# Patient Record
Sex: FEMALE | Race: WHITE | NOT HISPANIC OR LATINO | Employment: FULL TIME | URBAN - METROPOLITAN AREA
[De-identification: names, ages, dates, MRNs, and addresses within clinical notes are randomized per-mention and may not be internally consistent; named-entity substitution may affect disease eponyms.]

---

## 2023-03-28 DIAGNOSIS — M19.90 ARTHRITIS: ICD-10-CM

## 2023-03-28 RX ORDER — MELOXICAM 15 MG/1
15 TABLET ORAL DAILY
COMMUNITY
Start: 2013-09-13 | End: 2023-03-28 | Stop reason: SDUPTHER

## 2023-03-29 RX ORDER — MELOXICAM 15 MG/1
15 TABLET ORAL DAILY
Qty: 14 TABLET | Refills: 0 | Status: SHIPPED | OUTPATIENT
Start: 2023-03-29 | End: 2023-04-11 | Stop reason: SDUPTHER

## 2023-04-11 DIAGNOSIS — M19.90 ARTHRITIS: ICD-10-CM

## 2023-04-11 RX ORDER — MELOXICAM 15 MG/1
15 TABLET ORAL DAILY
Qty: 30 TABLET | Refills: 0 | Status: SHIPPED | OUTPATIENT
Start: 2023-04-11 | End: 2023-04-20 | Stop reason: SDUPTHER

## 2023-04-20 ENCOUNTER — OFFICE VISIT (OUTPATIENT)
Dept: PRIMARY CARE | Facility: CLINIC | Age: 63
End: 2023-04-20
Payer: COMMERCIAL

## 2023-04-20 VITALS
SYSTOLIC BLOOD PRESSURE: 114 MMHG | BODY MASS INDEX: 27.09 KG/M2 | WEIGHT: 162.6 LBS | HEART RATE: 101 BPM | DIASTOLIC BLOOD PRESSURE: 82 MMHG | OXYGEN SATURATION: 98 % | HEIGHT: 65 IN | RESPIRATION RATE: 18 BRPM

## 2023-04-20 DIAGNOSIS — M19.90 ARTHRITIS: ICD-10-CM

## 2023-04-20 DIAGNOSIS — Z12.31 SCREENING MAMMOGRAM, ENCOUNTER FOR: ICD-10-CM

## 2023-04-20 DIAGNOSIS — F32.A DEPRESSION, UNSPECIFIED DEPRESSION TYPE: ICD-10-CM

## 2023-04-20 DIAGNOSIS — Z00.00 HEALTH CARE MAINTENANCE: Primary | ICD-10-CM

## 2023-04-20 DIAGNOSIS — F98.8 ATTENTION DEFICIT DISORDER WITHOUT HYPERACTIVITY: ICD-10-CM

## 2023-04-20 DIAGNOSIS — Z12.11 SCREENING FOR COLON CANCER: ICD-10-CM

## 2023-04-20 DIAGNOSIS — Z13.6 SCREENING FOR CARDIOVASCULAR CONDITION: ICD-10-CM

## 2023-04-20 DIAGNOSIS — F51.01 PRIMARY INSOMNIA: ICD-10-CM

## 2023-04-20 PROBLEM — G89.29 CHRONIC BACK PAIN GREATER THAN 3 MONTHS DURATION: Status: RESOLVED | Noted: 2023-04-20 | Resolved: 2023-04-20

## 2023-04-20 PROBLEM — F41.9 ANXIETY DISORDER: Status: RESOLVED | Noted: 2023-04-20 | Resolved: 2023-04-20

## 2023-04-20 PROBLEM — R53.83 FATIGUE: Status: RESOLVED | Noted: 2023-04-20 | Resolved: 2023-04-20

## 2023-04-20 PROBLEM — J30.9 ALLERGIC RHINOSINUSITIS: Status: RESOLVED | Noted: 2023-04-20 | Resolved: 2023-04-20

## 2023-04-20 PROBLEM — M51.26 LUMBAR HERNIATED DISC: Status: ACTIVE | Noted: 2023-04-20

## 2023-04-20 PROBLEM — M54.9 CHRONIC BACK PAIN GREATER THAN 3 MONTHS DURATION: Status: RESOLVED | Noted: 2023-04-20 | Resolved: 2023-04-20

## 2023-04-20 PROBLEM — F17.200 NICOTINE DEPENDENCE: Status: RESOLVED | Noted: 2023-04-20 | Resolved: 2023-04-20

## 2023-04-20 PROBLEM — R51.9 HEADACHE: Status: RESOLVED | Noted: 2023-04-20 | Resolved: 2023-04-20

## 2023-04-20 PROBLEM — G58.8 INTERCOSTAL NEURALGIA: Status: RESOLVED | Noted: 2023-04-20 | Resolved: 2023-04-20

## 2023-04-20 PROBLEM — G47.00 INSOMNIA: Status: ACTIVE | Noted: 2023-04-20

## 2023-04-20 PROCEDURE — 99396 PREV VISIT EST AGE 40-64: CPT | Performed by: NURSE PRACTITIONER

## 2023-04-20 PROCEDURE — 1036F TOBACCO NON-USER: CPT | Performed by: NURSE PRACTITIONER

## 2023-04-20 PROCEDURE — 99213 OFFICE O/P EST LOW 20 MIN: CPT | Performed by: NURSE PRACTITIONER

## 2023-04-20 RX ORDER — MELOXICAM 15 MG/1
15 TABLET ORAL DAILY
Qty: 90 TABLET | Refills: 1 | Status: SHIPPED | OUTPATIENT
Start: 2023-04-20 | End: 2023-10-02

## 2023-04-20 RX ORDER — ALBUTEROL SULFATE 90 UG/1
2 AEROSOL, METERED RESPIRATORY (INHALATION)
COMMUNITY
Start: 2018-03-27 | End: 2024-01-30 | Stop reason: SDUPTHER

## 2023-04-20 RX ORDER — VENLAFAXINE HYDROCHLORIDE 150 MG/1
150 CAPSULE, EXTENDED RELEASE ORAL DAILY
COMMUNITY
End: 2023-05-08

## 2023-04-20 RX ORDER — TRAZODONE HYDROCHLORIDE 300 MG/1
300 TABLET ORAL NIGHTLY
Qty: 90 TABLET | Refills: 3 | Status: SHIPPED | OUTPATIENT
Start: 2023-04-20 | End: 2024-03-12

## 2023-04-20 RX ORDER — BUPROPION HYDROCHLORIDE 100 MG/1
100 TABLET, EXTENDED RELEASE ORAL DAILY
Qty: 30 TABLET | Refills: 2 | Status: SHIPPED | OUTPATIENT
Start: 2023-04-20 | End: 2023-05-15

## 2023-04-20 RX ORDER — TRAZODONE HYDROCHLORIDE 300 MG/1
300 TABLET ORAL NIGHTLY
COMMUNITY
End: 2023-04-20 | Stop reason: SDUPTHER

## 2023-04-20 NOTE — ASSESSMENT & PLAN NOTE
Discussed her UDS results at length.  Discussed that even if she did not know there may have been fentanyl possibly in the gummies that it still voided her contract as she did not have script for this herself nor did she disclose her use to me  Continue venlafaxine 150mg ER  Start buproprion 100 mg ER  Follow up in 6 weeks if no improvement

## 2023-04-20 NOTE — PROGRESS NOTES
"Subjective   Patient ID: Vandana Coppola is a 62 y.o. female who presents for Follow-up (Patient in today for routine F/U and medication refills, she would like to discuss anti-depressant medication. 162.2) and Annual Exam (CPE).    Following up for depression  Has felt more down over the past few months.   She has diagnosis of ADD - did UDS and found to have fentanyl positive which voided contract. She does not have script for fentanyl.  She is not sure why it was positive.  Discussed with her - she did consume some of her friend's THC gummies and not sure if this is why she was positive.  Since she could no longer have stimulant she has felt her concentration has not been good and difficult to complete tasks or hobbies.   Denies SI  Mammogram - unsure  Colonoscopy at age 46 - not since  Pap - unsure of last date         Review of Systems   All other systems reviewed and are negative.      Objective   /82   Pulse 101   Resp 18   Ht 1.638 m (5' 4.5\")   Wt 73.8 kg (162 lb 9.6 oz)   SpO2 98%   BMI 27.48 kg/m²     Physical Exam  Vitals and nursing note reviewed.   Constitutional:       General: She is not in acute distress.     Appearance: Normal appearance.   HENT:      Head: Normocephalic and atraumatic.      Right Ear: External ear normal.      Left Ear: External ear normal.      Nose: Nose normal.      Mouth/Throat:      Mouth: Mucous membranes are moist.      Pharynx: Oropharynx is clear.   Eyes:      Extraocular Movements: Extraocular movements intact.      Conjunctiva/sclera: Conjunctivae normal.      Pupils: Pupils are equal, round, and reactive to light.   Neck:      Vascular: No carotid bruit.   Cardiovascular:      Rate and Rhythm: Normal rate and regular rhythm.      Pulses: Normal pulses.      Heart sounds: Normal heart sounds.   Pulmonary:      Effort: Pulmonary effort is normal.      Breath sounds: Normal breath sounds.   Abdominal:      General: Bowel sounds are normal.      Palpations: " Abdomen is soft.   Musculoskeletal:      Cervical back: Normal range of motion.      Right lower leg: No edema.      Left lower leg: No edema.   Lymphadenopathy:      Cervical: No cervical adenopathy.   Skin:     General: Skin is warm and dry.      Capillary Refill: Capillary refill takes less than 2 seconds.   Neurological:      General: No focal deficit present.      Mental Status: She is alert and oriented to person, place, and time. Mental status is at baseline.   Psychiatric:         Mood and Affect: Mood normal.         Behavior: Behavior normal.         Thought Content: Thought content normal.         Judgment: Judgment normal.         Assessment/Plan   Problem List Items Addressed This Visit          Nervous    Insomnia     Stable   Continue trazodone         Relevant Medications    traZODone (Desyrel) 300 mg tablet    Other Relevant Orders    CBC    Comprehensive Metabolic Panel    TSH with reflex to Free T4 if abnormal    Vitamin B12    Vitamin D 1,25 Dihydroxy       Musculoskeletal    Arthritis    Relevant Medications    meloxicam (Mobic) 15 mg tablet       Other    Depression     Discussed her UDS results at length.  Discussed that even if she did not know there may have been fentanyl possibly in the gummies that it still voided her contract as she did not have script for this herself nor did she disclose her use to me  Continue venlafaxine 150mg ER  Start buproprion 100 mg ER  Follow up in 6 weeks if no improvement          Relevant Medications    buPROPion SR (Wellbutrin SR) 100 mg 12 hr tablet    Attention deficit disorder without hyperactivity     Discussed her UDS results at length.  Discussed that even if she did not know there may have been fentanyl possibly in the gummies that it still voided her contract as she did not have script for this herself nor did she disclose her use to me  Continue venlafaxine 150mg ER  Start buproprion 100 mg ER  Follow up in 6 weeks if no improvement           Relevant Medications    buPROPion SR (Wellbutrin SR) 100 mg 12 hr tablet    Health care maintenance - Primary     - CBC, CMP, Lipid  -  up to date with immunizations  -  Pap - needs this year  -  Mammogram ordered  -  cologuard ordered   -  eat a diet high in lean protein, vegetable, fruits, and low in carbohydrates  -  exercise 20-30 minutes 4-5 days a week          Other Visit Diagnoses       Screening for cardiovascular condition        Relevant Orders    Lipid Panel    Screening mammogram, encounter for        Relevant Orders    BI mammo bilateral screening tomosynthesis    Screening for colon cancer        Relevant Orders    Cologuard® colon cancer screening

## 2023-04-20 NOTE — ASSESSMENT & PLAN NOTE
- CBC, CMP, Lipid  -  up to date with immunizations  -  Pap - needs this year  -  Mammogram ordered  -  cologuard ordered   -  eat a diet high in lean protein, vegetable, fruits, and low in carbohydrates  -  exercise 20-30 minutes 4-5 days a week

## 2023-04-20 NOTE — PATIENT INSTRUCTIONS
Continue the effexor once a day    Start the wellbutrin once a day- this should help with the depression and the inattention.    If depression is not improved in 6 weeks please follow up    Have fasting labs completed     Have mammogram done call [phone]  Complete the cologuard - it will be mailed to your house

## 2023-05-07 DIAGNOSIS — F32.9 MAJOR DEPRESSIVE DISORDER, SINGLE EPISODE, UNSPECIFIED: ICD-10-CM

## 2023-05-08 RX ORDER — VENLAFAXINE HYDROCHLORIDE 150 MG/1
CAPSULE, EXTENDED RELEASE ORAL
Qty: 90 CAPSULE | Refills: 1 | Status: SHIPPED | OUTPATIENT
Start: 2023-05-08 | End: 2024-02-13

## 2023-05-13 LAB — NONINV COLON CA DNA+OCC BLD SCRN STL QL: NEGATIVE

## 2023-06-07 ENCOUNTER — APPOINTMENT (OUTPATIENT)
Dept: PRIMARY CARE | Facility: CLINIC | Age: 63
End: 2023-06-07
Payer: COMMERCIAL

## 2023-07-16 DIAGNOSIS — F32.A DEPRESSION, UNSPECIFIED DEPRESSION TYPE: ICD-10-CM

## 2023-07-16 DIAGNOSIS — F98.8 ATTENTION DEFICIT DISORDER WITHOUT HYPERACTIVITY: ICD-10-CM

## 2023-07-17 RX ORDER — BUPROPION HYDROCHLORIDE 100 MG/1
100 TABLET, EXTENDED RELEASE ORAL DAILY
Qty: 90 TABLET | Refills: 0 | Status: SHIPPED | OUTPATIENT
Start: 2023-07-17 | End: 2023-10-02

## 2023-10-02 DIAGNOSIS — F32.A DEPRESSION, UNSPECIFIED DEPRESSION TYPE: ICD-10-CM

## 2023-10-02 DIAGNOSIS — M19.90 ARTHRITIS: ICD-10-CM

## 2023-10-02 DIAGNOSIS — F98.8 ATTENTION DEFICIT DISORDER WITHOUT HYPERACTIVITY: ICD-10-CM

## 2023-10-02 RX ORDER — BUPROPION HYDROCHLORIDE 100 MG/1
100 TABLET, EXTENDED RELEASE ORAL DAILY
Qty: 30 TABLET | Refills: 0 | Status: SHIPPED | OUTPATIENT
Start: 2023-10-02 | End: 2023-10-26

## 2023-10-02 RX ORDER — MELOXICAM 15 MG/1
15 TABLET ORAL DAILY
Qty: 30 TABLET | Refills: 0 | Status: SHIPPED | OUTPATIENT
Start: 2023-10-02 | End: 2023-10-31

## 2023-10-26 DIAGNOSIS — F98.8 ATTENTION DEFICIT DISORDER WITHOUT HYPERACTIVITY: ICD-10-CM

## 2023-10-26 DIAGNOSIS — F32.A DEPRESSION, UNSPECIFIED DEPRESSION TYPE: ICD-10-CM

## 2023-10-26 RX ORDER — BUPROPION HYDROCHLORIDE 100 MG/1
TABLET, EXTENDED RELEASE ORAL
Qty: 90 TABLET | Refills: 1 | Status: SHIPPED | OUTPATIENT
Start: 2023-10-26 | End: 2023-10-28

## 2023-10-28 RX ORDER — BUPROPION HYDROCHLORIDE 100 MG/1
TABLET, EXTENDED RELEASE ORAL
Qty: 90 TABLET | Refills: 1 | Status: SHIPPED | OUTPATIENT
Start: 2023-10-28 | End: 2024-01-30 | Stop reason: SINTOL

## 2023-10-31 DIAGNOSIS — M19.90 ARTHRITIS: ICD-10-CM

## 2023-10-31 RX ORDER — MELOXICAM 15 MG/1
TABLET ORAL
Qty: 30 TABLET | Refills: 0 | Status: SHIPPED | OUTPATIENT
Start: 2023-10-31 | End: 2023-11-28

## 2023-11-28 DIAGNOSIS — M19.90 ARTHRITIS: ICD-10-CM

## 2023-11-28 RX ORDER — MELOXICAM 15 MG/1
TABLET ORAL
Qty: 30 TABLET | Refills: 0 | Status: SHIPPED | OUTPATIENT
Start: 2023-11-28 | End: 2023-12-26

## 2024-01-30 ENCOUNTER — OFFICE VISIT (OUTPATIENT)
Dept: PRIMARY CARE | Facility: CLINIC | Age: 64
End: 2024-01-30
Payer: MEDICARE

## 2024-01-30 VITALS
SYSTOLIC BLOOD PRESSURE: 140 MMHG | DIASTOLIC BLOOD PRESSURE: 80 MMHG | HEART RATE: 88 BPM | BODY MASS INDEX: 27.21 KG/M2 | OXYGEN SATURATION: 98 % | WEIGHT: 161 LBS

## 2024-01-30 DIAGNOSIS — F41.8 DEPRESSION WITH ANXIETY: ICD-10-CM

## 2024-01-30 DIAGNOSIS — N94.10 DYSPAREUNIA IN FEMALE: ICD-10-CM

## 2024-01-30 DIAGNOSIS — R06.2 WHEEZING: Primary | ICD-10-CM

## 2024-01-30 DIAGNOSIS — N95.1 POST MENOPAUSAL SYNDROME: ICD-10-CM

## 2024-01-30 DIAGNOSIS — M62.830 MUSCLE SPASM OF BACK: ICD-10-CM

## 2024-01-30 DIAGNOSIS — M81.0 AGE-RELATED OSTEOPOROSIS WITHOUT CURRENT PATHOLOGICAL FRACTURE: ICD-10-CM

## 2024-01-30 PROCEDURE — 1036F TOBACCO NON-USER: CPT

## 2024-01-30 PROCEDURE — 99214 OFFICE O/P EST MOD 30 MIN: CPT

## 2024-01-30 RX ORDER — ALBUTEROL SULFATE 90 UG/1
2 AEROSOL, METERED RESPIRATORY (INHALATION)
Qty: 18 G | Refills: 0 | Status: SHIPPED | OUTPATIENT
Start: 2024-01-30 | End: 2024-03-12

## 2024-01-30 RX ORDER — BACLOFEN 10 MG/1
10 TABLET ORAL 2 TIMES DAILY
Qty: 60 TABLET | Refills: 5 | Status: SHIPPED | OUTPATIENT
Start: 2024-01-30 | End: 2024-03-12

## 2024-01-30 RX ORDER — ESTRADIOL 0.1 MG/G
2 CREAM VAGINAL DAILY
Qty: 42.5 G | Refills: 5 | Status: SHIPPED | OUTPATIENT
Start: 2024-01-30 | End: 2025-01-29

## 2024-01-30 ASSESSMENT — ENCOUNTER SYMPTOMS
FACIAL ASYMMETRY: 0
JOINT SWELLING: 0
SEIZURES: 0
NAUSEA: 0
BACK PAIN: 0
ARTHRALGIAS: 0
CONSTIPATION: 0
WHEEZING: 0
PALPITATIONS: 0
HEMATURIA: 0
COUGH: 0
PSYCHIATRIC NEGATIVE: 1
DYSURIA: 0
SORE THROAT: 0
RHINORRHEA: 0
ABDOMINAL PAIN: 0
FATIGUE: 0
WEAKNESS: 0
LIGHT-HEADEDNESS: 0
WOUND: 0
TROUBLE SWALLOWING: 0
SINUS PAIN: 0
FEVER: 0
UNEXPECTED WEIGHT CHANGE: 0
SINUS PRESSURE: 0
SHORTNESS OF BREATH: 0

## 2024-01-30 ASSESSMENT — PATIENT HEALTH QUESTIONNAIRE - PHQ9
SUM OF ALL RESPONSES TO PHQ9 QUESTIONS 1 AND 2: 0
1. LITTLE INTEREST OR PLEASURE IN DOING THINGS: NOT AT ALL
2. FEELING DOWN, DEPRESSED OR HOPELESS: NOT AT ALL

## 2024-01-30 NOTE — PROGRESS NOTES
Subjective   Patient ID: Vandana Coppola is a 63 y.o. female who presents for Follow-up (Wanted cpe last one was 4/20/23, would like referral for counseling to talk to some one about mental health due to personal issues, concerns with having sex too painful. Would like PAP if possible//163/98 ).    Pt is here for annual wellness.  Reports overall health is get depressed at times but relatively healthy    Do you take any herbs or supplements that were not prescribed by a doctor? no  Are you taking calcium supplements? no  Are you taking aspirin daily? no  Colonoscopy: 2023 next in 2026  Fasting blood work: getting today  Last eye exam: nove 2023  Last dental Exam: dec 2023  Exercise:walks  Mood: pleasant  Sleep: with trazadone is good  Diet:  working on it  Occupation:  retired,  Do you have pain that bothers you in your daily life? yes    1. Patient Counseling:  --Nutrition: Stressed importance of moderation in sodium/caffeine intake, saturated fat and cholesterol, caloric balance, sufficient intake of fresh fruits, vegetables, fiber, calcium, iron, and 1 mg of folate supplement per day (for females capable of pregnancy).  --Discussed the issue of estrogen replacement, calcium supplement, and the daily use of baby aspirin.  --Exercise: Stressed the importance of regular exercise.   --Substance Abuse: Discussed cessation/primary prevention of tobacco, alcohol, or other drug use; driving or other dangerous activities under the influence; availability of treatment for abuse.    --Sexuality: Discussed sexually transmitted diseases, partner selection, use of condoms, avoidance of unintended pregnancy  and contraceptive alternatives.   --Injury prevention: Discussed safety belts, safety helmets, smoke detector, smoking near bedding or upholstery.   --Dental health: Discussed importance of regular tooth brushing, flossing, and dental visits.  --Immunizations reviewed.  --Discussed benefits of screening  colonoscopy.  --After hours service discussed with patient  2 Discussed the patient's BMI with her.  The BMI is in the acceptable range.  3 Follow up as needed for acute illness             Review of Systems   Constitutional:  Negative for fatigue, fever and unexpected weight change.   HENT:  Negative for congestion, ear discharge, ear pain, nosebleeds, postnasal drip, rhinorrhea, sinus pressure, sinus pain, sneezing, sore throat and trouble swallowing.    Respiratory:  Negative for cough, shortness of breath and wheezing.    Cardiovascular:  Negative for chest pain, palpitations and leg swelling.   Gastrointestinal:  Negative for abdominal pain, constipation and nausea.   Genitourinary:  Negative for dysuria, hematuria, menstrual problem, pelvic pain, vaginal bleeding and vaginal pain.   Musculoskeletal:  Negative for arthralgias, back pain, gait problem and joint swelling.   Skin:  Negative for rash and wound.   Neurological:  Negative for seizures, facial asymmetry, weakness and light-headedness.   Psychiatric/Behavioral: Negative.         Objective   /80   Pulse 88   Wt 73 kg (161 lb)   SpO2 98%   BMI 27.21 kg/m²     Physical Exam  Vitals and nursing note reviewed.   Constitutional:       General: She is not in acute distress.     Appearance: Normal appearance. She is not ill-appearing.   Cardiovascular:      Pulses: Normal pulses.      Heart sounds: Normal heart sounds.   Pulmonary:      Effort: Pulmonary effort is normal.      Breath sounds: Normal breath sounds.   Skin:     General: Skin is warm and dry.   Neurological:      Mental Status: She is alert and oriented to person, place, and time.         Assessment/Plan   Problem List Items Addressed This Visit    None  Visit Diagnoses         Codes    Wheezing    -  Primary R06.2    Relevant Medications    albuterol 90 mcg/actuation inhaler    Post menopausal syndrome     N95.1    Relevant Medications    estradiol (Estrace) 0.01 % (0.1 mg/gram) vaginal  cream    Other Relevant Orders    XR DEXA bone density    Depression with anxiety     F41.8     recently quit drinking is trying to work through old abuse     Relevant Orders    Referral to [facility]    Muscle spasm of back     M62.830    hx of fibro mayalgia feels is fairly well controlled has been having muscle spasm upper back was well controlled with baclofen in the past.     Relevant Medications    baclofen (Lioresal) 10 mg tablet    Dyspareunia in female     N94.10        Will be getting labs and mammo that were previously ordered done today    RTC 6 months for bp check and wellness visit

## 2024-02-02 ENCOUNTER — APPOINTMENT (OUTPATIENT)
Dept: RADIOLOGY | Facility: CLINIC | Age: 64
End: 2024-02-02
Payer: MEDICARE

## 2024-02-05 ENCOUNTER — HOSPITAL ENCOUNTER (OUTPATIENT)
Dept: RADIOLOGY | Facility: CLINIC | Age: 64
Discharge: HOME | End: 2024-02-05
Payer: MEDICARE

## 2024-02-05 DIAGNOSIS — N95.1 POST MENOPAUSAL SYNDROME: ICD-10-CM

## 2024-02-05 PROCEDURE — 77080 DXA BONE DENSITY AXIAL: CPT | Performed by: RADIOLOGY

## 2024-02-05 PROCEDURE — 77080 DXA BONE DENSITY AXIAL: CPT

## 2024-02-06 ENCOUNTER — TELEPHONE (OUTPATIENT)
Dept: PRIMARY CARE | Facility: CLINIC | Age: 64
End: 2024-02-06
Payer: MEDICARE

## 2024-02-06 DIAGNOSIS — F41.8 DEPRESSION WITH ANXIETY: Primary | ICD-10-CM

## 2024-02-06 DIAGNOSIS — Z12.31 ENCOUNTER FOR SCREENING MAMMOGRAM FOR MALIGNANT NEOPLASM OF BREAST: Primary | ICD-10-CM

## 2024-02-06 NOTE — TELEPHONE ENCOUNTER
Pt called regarding how she discontinued Wellbutrin and has tried buspar before because never really helped with anxiety would like to know if theres something else to try for her anxiety

## 2024-02-07 ENCOUNTER — LAB (OUTPATIENT)
Dept: LAB | Facility: LAB | Age: 64
End: 2024-02-07
Payer: MEDICARE

## 2024-02-07 DIAGNOSIS — Z13.6 SCREENING FOR CARDIOVASCULAR CONDITION: ICD-10-CM

## 2024-02-07 DIAGNOSIS — F51.01 PRIMARY INSOMNIA: ICD-10-CM

## 2024-02-07 LAB
ALBUMIN SERPL BCP-MCNC: 4.4 G/DL (ref 3.4–5)
ALP SERPL-CCNC: 76 U/L (ref 33–136)
ALT SERPL W P-5'-P-CCNC: 11 U/L (ref 7–45)
ANION GAP SERPL CALC-SCNC: 13 MMOL/L (ref 10–20)
AST SERPL W P-5'-P-CCNC: 16 U/L (ref 9–39)
BILIRUB SERPL-MCNC: 0.3 MG/DL (ref 0–1.2)
BUN SERPL-MCNC: 13 MG/DL (ref 6–23)
CALCIUM SERPL-MCNC: 9.6 MG/DL (ref 8.6–10.3)
CHLORIDE SERPL-SCNC: 104 MMOL/L (ref 98–107)
CHOLEST SERPL-MCNC: 222 MG/DL (ref 0–199)
CHOLESTEROL/HDL RATIO: 3.5
CO2 SERPL-SCNC: 28 MMOL/L (ref 21–32)
CREAT SERPL-MCNC: 0.85 MG/DL (ref 0.5–1.05)
EGFRCR SERPLBLD CKD-EPI 2021: 78 ML/MIN/1.73M*2
ERYTHROCYTE [DISTWIDTH] IN BLOOD BY AUTOMATED COUNT: 13.2 % (ref 11.5–14.5)
GLUCOSE SERPL-MCNC: 103 MG/DL (ref 74–99)
HCT VFR BLD AUTO: 41.9 % (ref 36–46)
HDLC SERPL-MCNC: 63.8 MG/DL
HGB BLD-MCNC: 13.4 G/DL (ref 12–16)
LDLC SERPL CALC-MCNC: 120 MG/DL
MCH RBC QN AUTO: 29.2 PG (ref 26–34)
MCHC RBC AUTO-ENTMCNC: 32 G/DL (ref 32–36)
MCV RBC AUTO: 91 FL (ref 80–100)
NON HDL CHOLESTEROL: 158 MG/DL (ref 0–149)
NRBC BLD-RTO: 0 /100 WBCS (ref 0–0)
PLATELET # BLD AUTO: 326 X10*3/UL (ref 150–450)
POTASSIUM SERPL-SCNC: 3.9 MMOL/L (ref 3.5–5.3)
PROT SERPL-MCNC: 6.9 G/DL (ref 6.4–8.2)
RBC # BLD AUTO: 4.59 X10*6/UL (ref 4–5.2)
SODIUM SERPL-SCNC: 141 MMOL/L (ref 136–145)
TRIGL SERPL-MCNC: 193 MG/DL (ref 0–149)
TSH SERPL-ACNC: 1.83 MIU/L (ref 0.44–3.98)
VIT B12 SERPL-MCNC: 368 PG/ML (ref 211–911)
VLDL: 39 MG/DL (ref 0–40)
WBC # BLD AUTO: 6.4 X10*3/UL (ref 4.4–11.3)

## 2024-02-07 PROCEDURE — 82652 VIT D 1 25-DIHYDROXY: CPT

## 2024-02-07 PROCEDURE — 85027 COMPLETE CBC AUTOMATED: CPT

## 2024-02-07 PROCEDURE — 82607 VITAMIN B-12: CPT

## 2024-02-07 PROCEDURE — 84443 ASSAY THYROID STIM HORMONE: CPT

## 2024-02-07 PROCEDURE — 36415 COLL VENOUS BLD VENIPUNCTURE: CPT

## 2024-02-07 PROCEDURE — 80053 COMPREHEN METABOLIC PANEL: CPT

## 2024-02-07 PROCEDURE — 80061 LIPID PANEL: CPT

## 2024-02-07 RX ORDER — HYDROXYZINE HYDROCHLORIDE 25 MG/1
25 TABLET, FILM COATED ORAL 2 TIMES DAILY
Qty: 60 TABLET | Refills: 0 | Status: SHIPPED | OUTPATIENT
Start: 2024-02-07 | End: 2024-02-29

## 2024-02-08 ENCOUNTER — HOSPITAL ENCOUNTER (OUTPATIENT)
Dept: RADIOLOGY | Facility: HOSPITAL | Age: 64
Discharge: HOME | End: 2024-02-08
Payer: MEDICARE

## 2024-02-08 VITALS — WEIGHT: 160 LBS | BODY MASS INDEX: 27.31 KG/M2 | HEIGHT: 64 IN

## 2024-02-08 DIAGNOSIS — Z12.31 ENCOUNTER FOR SCREENING MAMMOGRAM FOR MALIGNANT NEOPLASM OF BREAST: ICD-10-CM

## 2024-02-08 PROCEDURE — 77063 BREAST TOMOSYNTHESIS BI: CPT

## 2024-02-10 LAB — 1,25(OH)2D3 SERPL-MCNC: 53.8 PG/ML (ref 19.9–79.3)

## 2024-02-14 ENCOUNTER — HOSPITAL ENCOUNTER (OUTPATIENT)
Dept: RADIOLOGY | Facility: HOSPITAL | Age: 64
Discharge: HOME | End: 2024-02-14
Payer: MEDICARE

## 2024-02-14 VITALS — BODY MASS INDEX: 27.31 KG/M2 | HEIGHT: 64 IN | WEIGHT: 160 LBS

## 2024-02-14 DIAGNOSIS — R92.8 OTHER ABNORMAL AND INCONCLUSIVE FINDINGS ON DIAGNOSTIC IMAGING OF BREAST: ICD-10-CM

## 2024-02-14 PROCEDURE — 77061 BREAST TOMOSYNTHESIS UNI: CPT

## 2024-02-14 PROCEDURE — 76642 ULTRASOUND BREAST LIMITED: CPT | Mod: LT

## 2024-02-14 PROCEDURE — 76982 USE 1ST TARGET LESION: CPT | Mod: LT

## 2024-02-21 PROBLEM — M81.0 AGE-RELATED OSTEOPOROSIS WITHOUT CURRENT PATHOLOGICAL FRACTURE: Status: ACTIVE | Noted: 2024-02-21

## 2024-02-21 RX ORDER — ALENDRONATE SODIUM 70 MG/1
70 TABLET ORAL
Qty: 4 TABLET | Refills: 11 | Status: SHIPPED | OUTPATIENT
Start: 2024-02-21 | End: 2025-02-20

## 2024-06-05 DIAGNOSIS — M19.90 ARTHRITIS: ICD-10-CM

## 2024-06-05 RX ORDER — MELOXICAM 15 MG/1
15 TABLET ORAL DAILY
Qty: 30 TABLET | Refills: 0 | Status: SHIPPED | OUTPATIENT
Start: 2024-06-05

## 2024-11-12 DIAGNOSIS — M19.90 ARTHRITIS: ICD-10-CM

## 2024-11-12 RX ORDER — MELOXICAM 15 MG/1
15 TABLET ORAL DAILY
Qty: 90 TABLET | Refills: 1 | Status: SHIPPED | OUTPATIENT
Start: 2024-11-12

## 2024-11-23 DIAGNOSIS — M81.0 AGE-RELATED OSTEOPOROSIS WITHOUT CURRENT PATHOLOGICAL FRACTURE: ICD-10-CM

## 2024-11-25 RX ORDER — ALENDRONATE SODIUM 70 MG/1
70 TABLET ORAL
Qty: 12 TABLET | Refills: 3 | Status: SHIPPED | OUTPATIENT
Start: 2024-11-25 | End: 2025-11-25

## 2024-12-13 ENCOUNTER — APPOINTMENT (OUTPATIENT)
Dept: PRIMARY CARE | Facility: CLINIC | Age: 64
End: 2024-12-13
Payer: MEDICARE

## 2024-12-13 VITALS
HEART RATE: 90 BPM | SYSTOLIC BLOOD PRESSURE: 142 MMHG | DIASTOLIC BLOOD PRESSURE: 76 MMHG | BODY MASS INDEX: 27.14 KG/M2 | WEIGHT: 159 LBS | OXYGEN SATURATION: 95 % | HEIGHT: 64 IN

## 2024-12-13 DIAGNOSIS — F32.9 MAJOR DEPRESSIVE DISORDER, SINGLE EPISODE, UNSPECIFIED: ICD-10-CM

## 2024-12-13 DIAGNOSIS — F41.8 DEPRESSION WITH ANXIETY: ICD-10-CM

## 2024-12-13 DIAGNOSIS — M81.0 AGE-RELATED OSTEOPOROSIS WITHOUT CURRENT PATHOLOGICAL FRACTURE: ICD-10-CM

## 2024-12-13 DIAGNOSIS — Z23 NEED FOR VACCINATION: ICD-10-CM

## 2024-12-13 DIAGNOSIS — M19.90 ARTHRITIS: ICD-10-CM

## 2024-12-13 DIAGNOSIS — N95.1 POST MENOPAUSAL SYNDROME: ICD-10-CM

## 2024-12-13 DIAGNOSIS — F51.01 PRIMARY INSOMNIA: ICD-10-CM

## 2024-12-13 DIAGNOSIS — E55.9 VITAMIN D DEFICIENCY: ICD-10-CM

## 2024-12-13 DIAGNOSIS — Z12.39 BREAST SCREENING: ICD-10-CM

## 2024-12-13 DIAGNOSIS — M62.830 MUSCLE SPASM OF BACK: ICD-10-CM

## 2024-12-13 DIAGNOSIS — Z00.00 WELLNESS EXAMINATION: Primary | ICD-10-CM

## 2024-12-13 PROCEDURE — 3008F BODY MASS INDEX DOCD: CPT

## 2024-12-13 PROCEDURE — 99213 OFFICE O/P EST LOW 20 MIN: CPT

## 2024-12-13 PROCEDURE — 90656 IIV3 VACC NO PRSV 0.5 ML IM: CPT

## 2024-12-13 PROCEDURE — 1036F TOBACCO NON-USER: CPT

## 2024-12-13 PROCEDURE — 90471 IMMUNIZATION ADMIN: CPT

## 2024-12-13 PROCEDURE — 99396 PREV VISIT EST AGE 40-64: CPT

## 2024-12-13 RX ORDER — TRAZODONE HYDROCHLORIDE 300 MG/1
300 TABLET ORAL NIGHTLY
Qty: 90 TABLET | Refills: 3 | Status: SHIPPED | OUTPATIENT
Start: 2024-12-13 | End: 2025-12-13

## 2024-12-13 RX ORDER — VENLAFAXINE HYDROCHLORIDE 150 MG/1
150 CAPSULE, EXTENDED RELEASE ORAL DAILY
Qty: 90 CAPSULE | Refills: 1 | Status: SHIPPED | OUTPATIENT
Start: 2024-12-13

## 2024-12-13 RX ORDER — MELOXICAM 15 MG/1
15 TABLET ORAL DAILY
Qty: 90 TABLET | Refills: 2 | Status: SHIPPED | OUTPATIENT
Start: 2024-12-13

## 2024-12-13 RX ORDER — ESTRADIOL 0.1 MG/G
2 CREAM VAGINAL DAILY
Qty: 42.5 G | Refills: 5 | Status: SHIPPED | OUTPATIENT
Start: 2024-12-13 | End: 2025-12-13

## 2024-12-13 RX ORDER — ALENDRONATE SODIUM 70 MG/1
70 TABLET ORAL
Qty: 12 TABLET | Refills: 3 | Status: SHIPPED | OUTPATIENT
Start: 2024-12-13 | End: 2025-12-13

## 2024-12-13 RX ORDER — HYDROXYZINE HYDROCHLORIDE 25 MG/1
25 TABLET, FILM COATED ORAL 2 TIMES DAILY
Qty: 180 TABLET | Refills: 1 | Status: SHIPPED | OUTPATIENT
Start: 2024-12-13

## 2024-12-13 RX ORDER — BACLOFEN 10 MG/1
10 TABLET ORAL 2 TIMES DAILY
Qty: 180 TABLET | Refills: 1 | Status: SHIPPED | OUTPATIENT
Start: 2024-12-13

## 2024-12-13 ASSESSMENT — ENCOUNTER SYMPTOMS
PAIN: 1
VISUAL CHANGE: 0
DYSURIA: 0
FATIGUE: 1
CONSTIPATION: 0
ARTHRALGIAS: 1
WEAKNESS: 0
BACK PAIN: 1
FACIAL ASYMMETRY: 1
NECK PAIN: 1
NECK STIFFNESS: 1
HEADACHES: 1
INSOMNIA: 1
STIFFNESS: 1
MYALGIAS: 1
JOINT SWELLING: 1
VOMITING: 0

## 2024-12-13 NOTE — PROGRESS NOTES
Subjective   Patient ID: Vandana Coppola is a 64 y.o. female who presents for Annual Exam (Annual CPE/Transfer of care from [facility] to [facility]/93).    Pt is here for annual wellness.  Reports overall health is pretty good    Do you take any herbs or supplements that were not prescribed by a doctor? no  Are you taking calcium supplements? no  Are you taking aspirin daily? 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 yes    1. Patient Counseling:  --Nutrition: Stressed importance of moderation in sodium/caffeine intake, saturated fat and cholesterol, caloric balance, sufficient intake of fresh fruits, vegetables, fiber, calcium, iron, and 1 mg of folate supplement per day (for females capable of pregnancy).  --Discussed the issue of estrogen replacement, calcium supplement, and the daily use of baby aspirin as appropriate per pt.  --Exercise: Stressed the importance of regular exercise.   --Substance Abuse: Discussed cessation/primary prevention of tobacco, alcohol, or other drug use; driving or other dangerous activities under the influence; availability of treatment for abuse.    --Sexuality: Discussed sexually transmitted diseases, partner selection, use of condoms, avoidance of unintended pregnancy  and contraceptive alternatives.   --Injury prevention: Discussed safety belts, safety helmets, smoke detector, smoking near bedding or upholstery.   --Dental health: Discussed importance of regular tooth brushing, flossing, and dental visits.  --Immunizations reviewed.  --Discussed benefits of colon cancer screening.  --After hours service discussed with patient  2 Discussed the patient's BMI.  The BMI is in the  "acceptable range.  3 Follow up as needed for acute illness        Pain  This is a chronic problem. The current episode started more than 1 year ago. The problem occurs daily. The problem is unchanged. The pain occurs in the context of an injury. The pain is present in the neck. The pain is medium. Nothing aggravates the symptoms. Associated symptoms include fatigue, headaches, joint swelling and stiffness. Pertinent negatives include no constipation, dysuria, visual change, vomiting or weakness. Treatments tried: mobic. The treatment provided moderate relief. There is no swelling present.   Insomnia  This is a chronic problem. The current episode started more than 1 year ago. The problem occurs daily. The problem has been unchanged. Associated symptoms include arthralgias, fatigue, headaches, joint swelling, myalgias and neck pain. Pertinent negatives include no visual change, vomiting or weakness. Nothing aggravates the symptoms. Treatments tried: tramadol. The treatment provided significant relief.   Anxiety  Presents for follow-up visit. Symptoms include insomnia. Primary symptoms comment: well controlled with current medication. Symptoms occur most days. The severity of symptoms is causing significant distress. The quality of sleep is fair. Nighttime awakenings: several.     Compliance with medications is %.        Review of Systems   Constitutional:  Positive for fatigue.   HENT:  Positive for ear pain and hearing loss.    Gastrointestinal:  Negative for constipation and vomiting.   Genitourinary:  Negative for dysuria.   Musculoskeletal:  Positive for arthralgias, back pain, joint swelling, myalgias, neck pain, neck stiffness and stiffness.   Neurological:  Positive for facial asymmetry and headaches. Negative for weakness.        Baseline     Psychiatric/Behavioral:  The patient has insomnia.        Objective   Pulse 90   Ht 1.626 m (5' 4\")   Wt 72.1 kg (159 lb)   SpO2 95%   BMI 27.29 kg/m² "     Physical Exam    Assessment/Plan

## 2025-02-17 ENCOUNTER — HOSPITAL ENCOUNTER (OUTPATIENT)
Dept: RADIOLOGY | Facility: CLINIC | Age: 65
Discharge: HOME | End: 2025-02-17
Payer: MEDICARE

## 2025-02-17 VITALS — BODY MASS INDEX: 26.46 KG/M2 | HEIGHT: 64 IN | WEIGHT: 155 LBS

## 2025-02-17 DIAGNOSIS — Z12.39 BREAST SCREENING: ICD-10-CM

## 2025-02-17 PROCEDURE — 77067 SCR MAMMO BI INCL CAD: CPT | Performed by: STUDENT IN AN ORGANIZED HEALTH CARE EDUCATION/TRAINING PROGRAM

## 2025-02-17 PROCEDURE — 77067 SCR MAMMO BI INCL CAD: CPT

## 2025-02-17 PROCEDURE — 77063 BREAST TOMOSYNTHESIS BI: CPT | Performed by: STUDENT IN AN ORGANIZED HEALTH CARE EDUCATION/TRAINING PROGRAM

## 2025-02-18 LAB
25(OH)D3+25(OH)D2 SERPL-MCNC: 63 NG/ML (ref 30–100)
ALBUMIN SERPL-MCNC: 4.8 G/DL (ref 3.6–5.1)
ALP SERPL-CCNC: 60 U/L (ref 37–153)
ALT SERPL-CCNC: 9 U/L (ref 6–29)
ANION GAP SERPL CALCULATED.4IONS-SCNC: 11 MMOL/L (CALC) (ref 7–17)
AST SERPL-CCNC: 15 U/L (ref 10–35)
BILIRUB SERPL-MCNC: 0.4 MG/DL (ref 0.2–1.2)
BUN SERPL-MCNC: 17 MG/DL (ref 7–25)
CALCIUM SERPL-MCNC: 10.6 MG/DL (ref 8.6–10.4)
CHLORIDE SERPL-SCNC: 101 MMOL/L (ref 98–110)
CHOLEST SERPL-MCNC: 246 MG/DL
CHOLEST/HDLC SERPL: 3.5 (CALC)
CO2 SERPL-SCNC: 29 MMOL/L (ref 20–32)
CREAT SERPL-MCNC: 0.95 MG/DL (ref 0.5–1.05)
EGFRCR SERPLBLD CKD-EPI 2021: 67 ML/MIN/1.73M2
ERYTHROCYTE [DISTWIDTH] IN BLOOD BY AUTOMATED COUNT: 12.5 % (ref 11–15)
GLUCOSE SERPL-MCNC: 111 MG/DL (ref 65–139)
HCT VFR BLD AUTO: 40.5 % (ref 35–45)
HDLC SERPL-MCNC: 71 MG/DL
HGB BLD-MCNC: 13.5 G/DL (ref 11.7–15.5)
LDLC SERPL CALC-MCNC: 145 MG/DL (CALC)
MCH RBC QN AUTO: 30.6 PG (ref 27–33)
MCHC RBC AUTO-ENTMCNC: 33.3 G/DL (ref 32–36)
MCV RBC AUTO: 91.8 FL (ref 80–100)
NONHDLC SERPL-MCNC: 175 MG/DL (CALC)
PLATELET # BLD AUTO: 341 THOUSAND/UL (ref 140–400)
PMV BLD REES-ECKER: 9.5 FL (ref 7.5–12.5)
POTASSIUM SERPL-SCNC: 4.1 MMOL/L (ref 3.5–5.3)
PROT SERPL-MCNC: 7.3 G/DL (ref 6.1–8.1)
RBC # BLD AUTO: 4.41 MILLION/UL (ref 3.8–5.1)
SODIUM SERPL-SCNC: 141 MMOL/L (ref 135–146)
TRIGL SERPL-MCNC: 162 MG/DL
TSH SERPL-ACNC: 2.02 MIU/L (ref 0.4–4.5)
VIT B12 SERPL-MCNC: 392 PG/ML (ref 200–1100)
WBC # BLD AUTO: 6.3 THOUSAND/UL (ref 3.8–10.8)

## 2025-05-10 ENCOUNTER — APPOINTMENT (OUTPATIENT)
Dept: RADIOLOGY | Facility: HOSPITAL | Age: 65
End: 2025-05-10
Payer: MEDICARE

## 2025-05-10 ENCOUNTER — APPOINTMENT (OUTPATIENT)
Dept: CARDIOLOGY | Facility: HOSPITAL | Age: 65
End: 2025-05-10
Payer: MEDICARE

## 2025-05-10 ENCOUNTER — HOSPITAL ENCOUNTER (EMERGENCY)
Facility: HOSPITAL | Age: 65
Discharge: HOME | End: 2025-05-10
Attending: STUDENT IN AN ORGANIZED HEALTH CARE EDUCATION/TRAINING PROGRAM
Payer: MEDICARE

## 2025-05-10 VITALS
HEART RATE: 95 BPM | HEIGHT: 64 IN | WEIGHT: 149.03 LBS | OXYGEN SATURATION: 98 % | BODY MASS INDEX: 25.44 KG/M2 | SYSTOLIC BLOOD PRESSURE: 122 MMHG | TEMPERATURE: 98.4 F | DIASTOLIC BLOOD PRESSURE: 79 MMHG | RESPIRATION RATE: 18 BRPM

## 2025-05-10 DIAGNOSIS — K57.92 ACUTE DIVERTICULITIS: ICD-10-CM

## 2025-05-10 DIAGNOSIS — R10.32 LEFT LOWER QUADRANT ABDOMINAL PAIN: Primary | ICD-10-CM

## 2025-05-10 DIAGNOSIS — E86.0 DEHYDRATION: ICD-10-CM

## 2025-05-10 DIAGNOSIS — R11.2 NAUSEA AND VOMITING, UNSPECIFIED VOMITING TYPE: ICD-10-CM

## 2025-05-10 LAB
ALBUMIN SERPL BCP-MCNC: 4.4 G/DL (ref 3.4–5)
ALP SERPL-CCNC: 64 U/L (ref 33–136)
ALT SERPL W P-5'-P-CCNC: 6 U/L (ref 7–45)
ANION GAP SERPL CALCULATED.3IONS-SCNC: 17 MMOL/L (ref 10–20)
APPEARANCE UR: ABNORMAL
AST SERPL W P-5'-P-CCNC: 10 U/L (ref 9–39)
BACTERIA #/AREA URNS AUTO: ABNORMAL /HPF
BASOPHILS # BLD AUTO: 0.06 X10*3/UL (ref 0–0.1)
BASOPHILS NFR BLD AUTO: 0.4 %
BILIRUB SERPL-MCNC: 0.5 MG/DL (ref 0–1.2)
BILIRUB UR STRIP.AUTO-MCNC: ABNORMAL MG/DL
BUN SERPL-MCNC: 12 MG/DL (ref 6–23)
CALCIUM SERPL-MCNC: 9.4 MG/DL (ref 8.6–10.3)
CARDIAC TROPONIN I PNL SERPL HS: 3 NG/L (ref 0–13)
CARDIAC TROPONIN I PNL SERPL HS: 4 NG/L (ref 0–13)
CHLORIDE SERPL-SCNC: 98 MMOL/L (ref 98–107)
CO2 SERPL-SCNC: 24 MMOL/L (ref 21–32)
COLOR UR: YELLOW
CREAT SERPL-MCNC: 0.91 MG/DL (ref 0.5–1.05)
EGFRCR SERPLBLD CKD-EPI 2021: 71 ML/MIN/1.73M*2
EOSINOPHIL # BLD AUTO: 0.04 X10*3/UL (ref 0–0.7)
EOSINOPHIL NFR BLD AUTO: 0.3 %
ERYTHROCYTE [DISTWIDTH] IN BLOOD BY AUTOMATED COUNT: 12.3 % (ref 11.5–14.5)
GLUCOSE SERPL-MCNC: 100 MG/DL (ref 74–99)
GLUCOSE UR STRIP.AUTO-MCNC: ABNORMAL MG/DL
HCT VFR BLD AUTO: 37.9 % (ref 36–46)
HGB BLD-MCNC: 12.7 G/DL (ref 12–16)
HOLD SPECIMEN: NORMAL
HYALINE CASTS #/AREA URNS AUTO: ABNORMAL /LPF
IMM GRANULOCYTES # BLD AUTO: 0.07 X10*3/UL (ref 0–0.7)
IMM GRANULOCYTES NFR BLD AUTO: 0.4 % (ref 0–0.9)
KETONES UR STRIP.AUTO-MCNC: ABNORMAL MG/DL
LACTATE SERPL-SCNC: 0.9 MMOL/L (ref 0.4–2)
LEUKOCYTE ESTERASE UR QL STRIP.AUTO: ABNORMAL
LYMPHOCYTES # BLD AUTO: 2.51 X10*3/UL (ref 1.2–4.8)
LYMPHOCYTES NFR BLD AUTO: 16.1 %
MAGNESIUM SERPL-MCNC: 1.96 MG/DL (ref 1.6–2.4)
MCH RBC QN AUTO: 30.2 PG (ref 26–34)
MCHC RBC AUTO-ENTMCNC: 33.5 G/DL (ref 32–36)
MCV RBC AUTO: 90 FL (ref 80–100)
MONOCYTES # BLD AUTO: 0.93 X10*3/UL (ref 0.1–1)
MONOCYTES NFR BLD AUTO: 6 %
MUCOUS THREADS #/AREA URNS AUTO: ABNORMAL /LPF
NEUTROPHILS # BLD AUTO: 12 X10*3/UL (ref 1.2–7.7)
NEUTROPHILS NFR BLD AUTO: 76.8 %
NITRITE UR QL STRIP.AUTO: NEGATIVE
NRBC BLD-RTO: 0 /100 WBCS (ref 0–0)
PH UR STRIP.AUTO: 5.5 [PH]
PLATELET # BLD AUTO: 291 X10*3/UL (ref 150–450)
POTASSIUM SERPL-SCNC: 3.2 MMOL/L (ref 3.5–5.3)
PROT SERPL-MCNC: 7.6 G/DL (ref 6.4–8.2)
PROT UR STRIP.AUTO-MCNC: ABNORMAL MG/DL
RBC # BLD AUTO: 4.21 X10*6/UL (ref 4–5.2)
RBC # UR STRIP.AUTO: ABNORMAL MG/DL
RBC #/AREA URNS AUTO: ABNORMAL /HPF
SODIUM SERPL-SCNC: 136 MMOL/L (ref 136–145)
SP GR UR STRIP.AUTO: 1.02
SQUAMOUS #/AREA URNS AUTO: ABNORMAL /HPF
TRANS CELLS #/AREA UR COMP ASSIST: ABNORMAL /HPF
UROBILINOGEN UR STRIP.AUTO-MCNC: 1 MG/DL
WBC # BLD AUTO: 15.6 X10*3/UL (ref 4.4–11.3)
WBC #/AREA URNS AUTO: ABNORMAL /HPF

## 2025-05-10 PROCEDURE — 74177 CT ABD & PELVIS W/CONTRAST: CPT

## 2025-05-10 PROCEDURE — 96372 THER/PROPH/DIAG INJ SC/IM: CPT | Performed by: CLINICAL NURSE SPECIALIST

## 2025-05-10 PROCEDURE — 36415 COLL VENOUS BLD VENIPUNCTURE: CPT | Performed by: CLINICAL NURSE SPECIALIST

## 2025-05-10 PROCEDURE — 2550000001 HC RX 255 CONTRASTS: Performed by: CLINICAL NURSE SPECIALIST

## 2025-05-10 PROCEDURE — 81001 URINALYSIS AUTO W/SCOPE: CPT | Performed by: CLINICAL NURSE SPECIALIST

## 2025-05-10 PROCEDURE — 83605 ASSAY OF LACTIC ACID: CPT | Performed by: CLINICAL NURSE SPECIALIST

## 2025-05-10 PROCEDURE — 71046 X-RAY EXAM CHEST 2 VIEWS: CPT

## 2025-05-10 PROCEDURE — 96375 TX/PRO/DX INJ NEW DRUG ADDON: CPT

## 2025-05-10 PROCEDURE — 2500000004 HC RX 250 GENERAL PHARMACY W/ HCPCS (ALT 636 FOR OP/ED): Mod: JZ | Performed by: CLINICAL NURSE SPECIALIST

## 2025-05-10 PROCEDURE — 74177 CT ABD & PELVIS W/CONTRAST: CPT | Mod: FOREIGN READ | Performed by: RADIOLOGY

## 2025-05-10 PROCEDURE — 99285 EMERGENCY DEPT VISIT HI MDM: CPT | Mod: 25 | Performed by: STUDENT IN AN ORGANIZED HEALTH CARE EDUCATION/TRAINING PROGRAM

## 2025-05-10 PROCEDURE — 96376 TX/PRO/DX INJ SAME DRUG ADON: CPT

## 2025-05-10 PROCEDURE — 80053 COMPREHEN METABOLIC PANEL: CPT | Performed by: CLINICAL NURSE SPECIALIST

## 2025-05-10 PROCEDURE — 83735 ASSAY OF MAGNESIUM: CPT | Performed by: CLINICAL NURSE SPECIALIST

## 2025-05-10 PROCEDURE — 84484 ASSAY OF TROPONIN QUANT: CPT | Performed by: CLINICAL NURSE SPECIALIST

## 2025-05-10 PROCEDURE — 96365 THER/PROPH/DIAG IV INF INIT: CPT | Mod: 59

## 2025-05-10 PROCEDURE — 87086 URINE CULTURE/COLONY COUNT: CPT | Mod: TRILAB | Performed by: CLINICAL NURSE SPECIALIST

## 2025-05-10 PROCEDURE — 85025 COMPLETE CBC W/AUTO DIFF WBC: CPT | Performed by: CLINICAL NURSE SPECIALIST

## 2025-05-10 PROCEDURE — 96361 HYDRATE IV INFUSION ADD-ON: CPT

## 2025-05-10 PROCEDURE — 2500000002 HC RX 250 W HCPCS SELF ADMINISTERED DRUGS (ALT 637 FOR MEDICARE OP, ALT 636 FOR OP/ED): Performed by: CLINICAL NURSE SPECIALIST

## 2025-05-10 PROCEDURE — 93005 ELECTROCARDIOGRAM TRACING: CPT

## 2025-05-10 PROCEDURE — 71046 X-RAY EXAM CHEST 2 VIEWS: CPT | Mod: FOREIGN READ | Performed by: RADIOLOGY

## 2025-05-10 RX ORDER — ONDANSETRON 4 MG/1
4 TABLET, FILM COATED ORAL EVERY 6 HOURS
Qty: 12 TABLET | Refills: 0 | Status: SHIPPED | OUTPATIENT
Start: 2025-05-10 | End: 2025-05-13

## 2025-05-10 RX ORDER — AMOXICILLIN AND CLAVULANATE POTASSIUM 875; 125 MG/1; MG/1
1 TABLET, FILM COATED ORAL EVERY 12 HOURS
Qty: 14 TABLET | Refills: 0 | Status: SHIPPED | OUTPATIENT
Start: 2025-05-10 | End: 2025-05-12 | Stop reason: SDUPTHER

## 2025-05-10 RX ORDER — DICYCLOMINE HYDROCHLORIDE 10 MG/ML
20 INJECTION INTRAMUSCULAR ONCE
Status: COMPLETED | OUTPATIENT
Start: 2025-05-10 | End: 2025-05-10

## 2025-05-10 RX ORDER — ONDANSETRON HYDROCHLORIDE 2 MG/ML
4 INJECTION, SOLUTION INTRAVENOUS ONCE
Status: COMPLETED | OUTPATIENT
Start: 2025-05-10 | End: 2025-05-10

## 2025-05-10 RX ORDER — POTASSIUM CHLORIDE 20 MEQ/1
40 TABLET, EXTENDED RELEASE ORAL ONCE
Status: COMPLETED | OUTPATIENT
Start: 2025-05-10 | End: 2025-05-10

## 2025-05-10 RX ORDER — KETOROLAC TROMETHAMINE 15 MG/ML
15 INJECTION, SOLUTION INTRAMUSCULAR; INTRAVENOUS ONCE
Status: COMPLETED | OUTPATIENT
Start: 2025-05-10 | End: 2025-05-10

## 2025-05-10 RX ORDER — MORPHINE SULFATE 4 MG/ML
4 INJECTION, SOLUTION INTRAMUSCULAR; INTRAVENOUS ONCE
Status: COMPLETED | OUTPATIENT
Start: 2025-05-10 | End: 2025-05-10

## 2025-05-10 RX ORDER — KETOROLAC TROMETHAMINE 10 MG/1
10 TABLET, FILM COATED ORAL EVERY 6 HOURS PRN
Qty: 20 TABLET | Refills: 0 | Status: SHIPPED | OUTPATIENT
Start: 2025-05-10 | End: 2025-05-15

## 2025-05-10 RX ADMIN — PIPERACILLIN SODIUM AND TAZOBACTAM SODIUM 4.5 G: 4; .5 INJECTION, SOLUTION INTRAVENOUS at 13:03

## 2025-05-10 RX ADMIN — SODIUM CHLORIDE 2000 ML: 900 INJECTION, SOLUTION INTRAVENOUS at 11:17

## 2025-05-10 RX ADMIN — KETOROLAC TROMETHAMINE 15 MG: 15 INJECTION, SOLUTION INTRAMUSCULAR; INTRAVENOUS at 13:17

## 2025-05-10 RX ADMIN — MORPHINE SULFATE 4 MG: 4 INJECTION, SOLUTION INTRAMUSCULAR; INTRAVENOUS at 13:17

## 2025-05-10 RX ADMIN — IOHEXOL 75 ML: 350 INJECTION, SOLUTION INTRAVENOUS at 11:58

## 2025-05-10 RX ADMIN — ONDANSETRON 4 MG: 2 INJECTION, SOLUTION INTRAMUSCULAR; INTRAVENOUS at 13:18

## 2025-05-10 RX ADMIN — DICYCLOMINE HYDROCHLORIDE 20 MG: 10 INJECTION, SOLUTION INTRAMUSCULAR at 11:56

## 2025-05-10 RX ADMIN — ONDANSETRON 4 MG: 2 INJECTION, SOLUTION INTRAMUSCULAR; INTRAVENOUS at 11:20

## 2025-05-10 RX ADMIN — POTASSIUM CHLORIDE 40 MEQ: 1500 TABLET, EXTENDED RELEASE ORAL at 13:03

## 2025-05-10 ASSESSMENT — PAIN DESCRIPTION - LOCATION: LOCATION: ABDOMEN

## 2025-05-10 ASSESSMENT — PAIN SCALES - GENERAL: PAINLEVEL_OUTOF10: 8

## 2025-05-10 ASSESSMENT — PAIN DESCRIPTION - PAIN TYPE: TYPE: ACUTE PAIN

## 2025-05-10 ASSESSMENT — COLUMBIA-SUICIDE SEVERITY RATING SCALE - C-SSRS
2. HAVE YOU ACTUALLY HAD ANY THOUGHTS OF KILLING YOURSELF?: NO
1. IN THE PAST MONTH, HAVE YOU WISHED YOU WERE DEAD OR WISHED YOU COULD GO TO SLEEP AND NOT WAKE UP?: NO
6. HAVE YOU EVER DONE ANYTHING, STARTED TO DO ANYTHING, OR PREPARED TO DO ANYTHING TO END YOUR LIFE?: NO

## 2025-05-10 ASSESSMENT — PAIN DESCRIPTION - ONSET: ONSET: SUDDEN

## 2025-05-10 ASSESSMENT — PAIN DESCRIPTION - ORIENTATION: ORIENTATION: LEFT;LOWER;UPPER

## 2025-05-10 ASSESSMENT — PAIN DESCRIPTION - PROGRESSION: CLINICAL_PROGRESSION: NOT CHANGED

## 2025-05-10 ASSESSMENT — PAIN - FUNCTIONAL ASSESSMENT: PAIN_FUNCTIONAL_ASSESSMENT: 0-10

## 2025-05-10 ASSESSMENT — PAIN DESCRIPTION - DESCRIPTORS
DESCRIPTORS: SORE
DESCRIPTORS: SORE

## 2025-05-10 ASSESSMENT — PAIN DESCRIPTION - FREQUENCY: FREQUENCY: CONSTANT/CONTINUOUS

## 2025-05-10 NOTE — ED PROVIDER NOTES
Department of Emergency Medicine   ED  Provider Note  Admit Date/RoomTime: 5/10/2025 10:53 AM  ED Room: AC20/AC20        History of Present Illness:  Chief Complaint   Patient presents with    Abdominal Pain     For the past week and half I have had nausea and vomiting lose of appetite and bloating in my lt side of my abd I started taking tryzepitide          Vandana Coppola is a 64 y.o. female presents to the emergency department complaints abdominal pain nausea or vomiting.  Onset of symptoms about a week and a half.  Patient reports she started taking tries appetite about 2 weeks ago for weight loss and since it has been having decreased appetite bloating abdominal pain and nausea and vomiting.  She denies any fever complains of chills at night.  No itchy watery eyes runny nose sore throat.  No pressure burning frequency with urination.  And reports he is having difficulty having bowel movements had a small hard bowel movement 2 days ago.  Presents now for evaluation.    Review of Systems:   Pertinent positives and negatives are stated within HPI, all other systems reviewed and are negative.        --------------------------------------------- PAST HISTORY ---------------------------------------------  Past Medical History:  has a past medical history of Acute candidiasis of vulva and vagina (03/21/2019), Allergic rhinosinusitis (04/20/2023), Anxiety disorder (04/20/2023), Encounter for blood-alcohol and blood-drug test (11/04/2016), H/O bilateral breast implants, Headache (04/20/2023), History of bilateral breast implants previously removed, Nicotine dependence (04/20/2023), Other specified cough (03/21/2019), Pain in thoracic spine (01/28/2016), Personal history of other drug therapy (11/10/2014), Personal history of other specified conditions, and Shortness of breath (03/27/2018).    She has no past medical history of Personal history of irradiation.  Past Surgical History:  has a past surgical history  that includes Neck surgery (11/10/2014); Gallbladder surgery (11/10/2014);  section, classic (11/10/2014); and Mandible surgery (11/10/2014).  Social History:  reports that she quit smoking about 6 years ago. Her smoking use included cigarettes. She has never used smokeless tobacco. She reports that she does not currently use alcohol. She reports that she does not use drugs.  Family History: family history includes Atrial fibrillation in her mother; Heart failure in her mother; gi bleed in her mother.. Unless otherwise noted, family history is non contributory  The patient’s home medications have been reviewed.  Allergies: Patient has no known allergies.        ---------------------------------------------------PHYSICAL EXAM--------------------------------------    GENERAL APPEARANCE: Awake and alert.   VITAL SIGNS: As per the nurses' triage record.  Tachycardia  HEENT: Normocephalic, atraumatic. Extraocular muscles are intact. Pupils equal round and reactive to light. Conjunctiva are pink. Negative scleral icterus. Mucous membranes are dry Tongue in the midline. Pharynx was without erythema or exudates, uvula midline  NECK: Soft Nontender and supple, full gross ROM, no meningeal signs.  CHEST: Nontender to palpation. Clear to auscultation bilaterally. No rales, rhonchi, or wheezing.   HEART: S1, S2. Regular rate and rhythm. No murmurs, gallops or rubs.  Strong and equal pulses in the extremities.   ABDOMEN: Soft, diffuse tenderness increased tenderness in the left upper and lower quadrant.  No rebound or guarding.  Negative for Leos sign McBurney's point tenderness.  No peritoneal signs noted., nondistended, positive bowel sounds, no palpable masses.  Back: No pain palpation of thoracic or lumbar spine no step-offs crepitus or bruising no CVA tenderness no saddle paresthesias  MUSCULCSKELETAL: The calves are nontender to palpation. Full gross active range of motion. Ambulating on own with no acute  "difficulties  NEUROLOGICAL: Awake, alert and oriented x 3. Power intact in the upper and lower extremities. Sensation is intact to light touch in the upper and lower extremities.   IMMUNOLOGICAL: No lymphatic streaking noted   DERM: No petechiae, rashes, or ecchymoses.          ------------------------- NURSING NOTES AND VITALS REVIEWED ---------------------------  The nursing notes within the ED encounter and vital signs as below have been reviewed by myself  /79 (BP Location: Left arm, Patient Position: Sitting)   Pulse 95   Temp 36.9 °C (98.4 °F) (Oral)   Resp 18   Ht 1.626 m (5' 4\")   Wt 67.6 kg (149 lb 0.5 oz)   SpO2 98%   BMI 25.58 kg/m²     Oxygen Saturation Interpretation: 98% room air    The cardiac monitor revealed sinus rhythm with a heart rate in the 90s as interpreted by me. The cardiac monitor was ordered secondary to the patient's heart rate and to monitor the patient for dysrhythmia.       The patient’s available past medical records and past encounters were reviewed.          -----------------------DIAGNOSTIC RESULTS------------------------  LABS:    Labs Reviewed   CBC WITH AUTO DIFFERENTIAL - Abnormal       Result Value    WBC 15.6 (*)     nRBC 0.0      RBC 4.21      Hemoglobin 12.7      Hematocrit 37.9      MCV 90      MCH 30.2      MCHC 33.5      RDW 12.3      Platelets 291      Neutrophils % 76.8      Immature Granulocytes %, Automated 0.4      Lymphocytes % 16.1      Monocytes % 6.0      Eosinophils % 0.3      Basophils % 0.4      Neutrophils Absolute 12.00 (*)     Immature Granulocytes Absolute, Automated 0.07      Lymphocytes Absolute 2.51      Monocytes Absolute 0.93      Eosinophils Absolute 0.04      Basophils Absolute 0.06     COMPREHENSIVE METABOLIC PANEL - Abnormal    Glucose 100 (*)     Sodium 136      Potassium 3.2 (*)     Chloride 98      Bicarbonate 24      Anion Gap 17      Urea Nitrogen 12      Creatinine 0.91      eGFR 71      Calcium 9.4      Albumin 4.4      " Alkaline Phosphatase 64      Total Protein 7.6      AST 10      Bilirubin, Total 0.5      ALT 6 (*)    URINALYSIS WITH REFLEX CULTURE AND MICROSCOPIC - Abnormal    Color, Urine Yellow      Appearance, Urine Cloudy (*)     Specific Gravity, Urine 1.025      pH, Urine 5.5      Protein, Urine 100 (2+) (*)     Glucose, Urine 100 (1+) (*)     Blood, Urine SMALL (1+) (*)     Ketones, Urine >=80 (3+) (*)     Bilirubin, Urine LARGE (3+) (*)     Urobilinogen, Urine 1.0      Nitrite, Urine NEGATIVE      Leukocyte Esterase, Urine SMALL (1+) (*)    MICROSCOPIC ONLY, URINE - Abnormal    WBC, Urine 6-10 (*)     RBC, Urine 1-2      Squamous Epithelial Cells, Urine 10-25 (FEW)      Transitional Epithelial Cells, Urine 1-2 (FEW)      Bacteria, Urine 1+ (*)     Mucus, Urine 2+      Hyaline Casts, Urine 1+ (*)    MAGNESIUM - Normal    Magnesium 1.96     SERIAL TROPONIN-INITIAL - Normal    Troponin I, High Sensitivity 4      Narrative:     Less than 99th percentile of normal range cutoff-  Female and children under 18 years old <14 ng/L; Male <21 ng/L: Negative  Repeat testing should be performed if clinically indicated.     Female and children under 18 years old 14-50 ng/L; Male 21-50 ng/L:  Consistent with possible cardiac damage and possible increased clinical   risk. Serial measurements may help to assess extent of myocardial damage.     >50 ng/L: Consistent with cardiac damage, increased clinical risk and  myocardial infarction. Serial measurements may help assess extent of   myocardial damage.      NOTE: Children less than 1 year old may have higher baseline troponin   levels and results should be interpreted in conjunction with the overall   clinical context.     NOTE: Troponin I testing is performed using a different   testing methodology at [facility] than at other   [facility]. Direct result comparisons should only   be made within the same method.   SERIAL TROPONIN, 1 HOUR - Normal    Troponin I, High  Sensitivity 3      Narrative:     Less than 99th percentile of normal range cutoff-  Female and children under 18 years old <14 ng/L; Male <21 ng/L: Negative  Repeat testing should be performed if clinically indicated.     Female and children under 18 years old 14-50 ng/L; Male 21-50 ng/L:  Consistent with possible cardiac damage and possible increased clinical   risk. Serial measurements may help to assess extent of myocardial damage.     >50 ng/L: Consistent with cardiac damage, increased clinical risk and  myocardial infarction. Serial measurements may help assess extent of   myocardial damage.      NOTE: Children less than 1 year old may have higher baseline troponin   levels and results should be interpreted in conjunction with the overall   clinical context.     NOTE: Troponin I testing is performed using a different   testing methodology at [facility] than at other   [facility]. Direct result comparisons should only   be made within the same method.   LACTATE - Normal    Lactate 0.9      Narrative:     Venipuncture immediately after or during the administration of Metamizole may lead to falsely low results. Testing should be performed immediately prior to Metamizole dosing.   URINE CULTURE   TROPONIN SERIES- (INITIAL, 1 HR)    Narrative:     The following orders were created for panel order Troponin I Series, High Sensitivity (0, 1 HR).  Procedure                               Abnormality         Status                     ---------                               -----------         ------                     Troponin I, High Sensiti...[[id number]]  Normal              Final result               Troponin, High Sensitivi...[[id number]]  Normal              Final result                 Please view results for these tests on the individual orders.   URINALYSIS WITH REFLEX CULTURE AND MICROSCOPIC    Narrative:     The following orders were created for panel order Urinalysis with Reflex Culture  and Microscopic.  Procedure                               Abnormality         Status                     ---------                               -----------         ------                     Urinalysis with Reflex C...[[id number]]  Abnormal            Final result               Extra Urine Gray Tube[[id number]]                            In process                   Please view results for these tests on the individual orders.   EXTRA URINE GRAY TUBE       As interpreted by me, the above displayed labs are abnormal. All other labs obtained during this visit were within normal range or not returned as of this dictation.      EKG Interpretation    1123 EKG on my interpretation shows normal sinus rhythm, rate of 99 bpm.  Normal axis.  QTc 446 ms,.  Of 115.  No ST elevation or depression, no acute ischemic pattern.  No STEMI. [NT]           XR chest 2 views   Final Result   No acute process.   Signed by Jesus Manuel Perez MD      CT abdomen pelvis w IV contrast   Final Result   1.Findings compatible with acute diverticulitis involving the   distal descending colon. No extraluminal gas or abscess.   2.Moderate wall thickening of the colon extending from the distal   descending colon to the rectum. Findings suggestive of accompanying   segmental colitis.  Recommend follow-up colonoscopy once symptoms   resolve.   3.Mild hepatic steatosis.   Signed by Jesus Manuel Perez MD              XR chest 2 views   Final Result   No acute process.   Signed by Jesus Manuel Perez MD      CT abdomen pelvis w IV contrast   Final Result   1.Findings compatible with acute diverticulitis involving the   distal descending colon. No extraluminal gas or abscess.   2.Moderate wall thickening of the colon extending from the distal   descending colon to the rectum. Findings suggestive of accompanying   segmental colitis.  Recommend follow-up colonoscopy once symptoms   resolve.   3.Mild hepatic steatosis.   Signed by Jesus Manuel Perez MD               ------------------------------ ED COURSE/MEDICAL DECISION MAKING----------------------  Medical Decision Making:   Exam: A medically appropriate exam performed, outlined above, given the known history and presentation.    History obtained from: Review of medical record nursing notes patient patient's family     Social Determinants of Health considered during this visit:  lives with family takes care of her self at home family present    PAST MEDICAL HISTORY/Chronic Conditions Affecting Care     has a past medical history of Acute candidiasis of vulva and vagina (03/21/2019), Allergic rhinosinusitis (04/20/2023), Anxiety disorder (04/20/2023), Encounter for blood-alcohol and blood-drug test (11/04/2016), H/O bilateral breast implants, Headache (04/20/2023), History of bilateral breast implants previously removed, Nicotine dependence (04/20/2023), Other specified cough (03/21/2019), Pain in thoracic spine (01/28/2016), Personal history of other drug therapy (11/10/2014), Personal history of other specified conditions, and Shortness of breath (03/27/2018).       CC/HPI Summary, Social Determinants of health, Records Reviewed, DDx, testing done/not done, ED Course, Reassessment, disposition considerations/shared decision making with patient, consults, disposition:   Presents to the emergency department with complaints of abdominal pain nausea vomiting difficulty having a bowel movement and decreased appetite after starting his appetite.  Plan  Chest x-ray, CBC, CMP, magnesium, troponin, Bentyl, Toradol, morphine, Zofran, Zosyn, potassium, normal saline 30 mg/kg, EKG, lactate, urine    Medical Decision Making/Differential Diagnosis:  Differentials include but not limited to medication side effect versus electrolyte abnormality versus anemia versus dehydration versus obstruction versus constipation versus biliary colic versus reflux versus UTI versus atypical chest pain  Review  Magnesium within normal  limits  White blood cell count 15.6  Hemoglobin 12.7  Troponin 4 repeat troponin 3 no complaints of chest pain  Lactate normal  Urine showed small leukocytes large bilirubin blood glucose WBC 6-10 no urinary symptoms sent for culture +1 bacteria  Potassium low at 3.2 otherwise electrolytes unremarkable.  Normal renal function  Patient presents to the emergency room complaints of abdominal pain nausea vomiting difficulty having a bowel movement after starting trace appetite.  White blood cell count noted to be 15.6 CT of the abdomen pelvis showed  1.Findings compatible with acute diverticulitis involving the  distal descending colon. No extraluminal gas or abscess.  2.Moderate wall thickening of the colon extending from the distal descending colon to the rectum. Findings suggestive of accompanying segmental colitis.  Recommend follow-up colonoscopy once symptoms  resolve. 3.Mild hepatic steatosis.  Patient given IV fluids in the emergency department as well as Zosyn discharged with Augmentin Toradol Zofran.  Referral to gastroenterology patient has close follow-up arranged offered admission patient prefers to go home.  Bentyl and morphine for pain in the emergency department improvement in symptoms she does not appear to be septic or toxic she initially was tachycardic suspect this is more to dehydration due to poor oral intake.  Repeat heart rate at 95 improved with fluid resuscitation.  She is afebrile.  Patient is not anemic.  Urine was abnormal as well with leuk esterase bilirubin ketones blood glucose white blood cell count 6-10 she has no urinary symptoms sent for culture suspect this more related to dehydration has been placed on antibiotic therapy.  EKG per attending note normal sinus rhythm no ST elevation or arrhythmia noted.  Troponins within normal limits no complaints of chest pain lactate is not elevated.  Potassium found to be low at 3.2 within normal magnesium replaced in the emergency department  otherwise electrolytes unremarkable.  Normal renal function LFTs unremarkable chest x-ray showed no acute process    Based on patient's clinical presentation history and symptoms consistent with diverticulitis placed on Augmentin follow-up with gastroenterology received Zosyn in the emergency department  Left lower quadrant pain likely secondary to diverticulitis with generalized abdominal pain  Nausea vomiting multifactoral likely secondary to the medication tries appetite and diverticulitis was given Zofran and fluid resuscitation in the emergency department with improvement of symptoms    Dehydration.  Improvement of symptoms.  Heart rate improved with fluid resuscitation  Patient seen evaluated with attending physician Dr. Lutz   CMT for discharge utilized to discharge planning incidental findings of CT reviewed with patient as well patient vies on supportive care measures at home.  Advised to hold taking the tries appetite until follow-up with primary care physician gastroenterology patient is amenable plan amenable to discharge    PROCEDURES  Unless otherwise noted below, none      CONSULTS:   None      ED Course as of 05/10/25 2106   Sat May 10, 2025   1123 EKG on my interpretation shows normal sinus rhythm, rate of 99 bpm.  Normal axis.  QTc 446 ms,.  Of 115.  No ST elevation or depression, no acute ischemic pattern.  No STEMI. [NT]   1230 POTASSIUM(!): 3.2  Replaced magnesium within normal limits  [TB]   1232 Ketones, Urine(!): >=80 (3+)  Abnormal urine patient denying any urinary symptoms concerning for dehydration patient did receive 30 mg kilograms IV fluids. [TB]   1232 CT abdomen pelvis w IV contrast  1.Findings compatible with acute diverticulitis involving the  distal descending colon. No extraluminal gas or abscess.  2.Moderate wall thickening of the colon extending from the distal  descending colon to the rectum. Findings suggestive of accompanying  segmental colitis.  Recommend follow-up  colonoscopy once symptoms  resolve.  3.Mild hepatic steatosis.  Patient given a dose of Zosyn.   [TB]   1314 Patient reevaluated reported initially had some improvement of her pain now the pain has returned.  Requesting additional pain medication before discharge.  Toradol morphine ordered was given referral to gastroenterology will have follow-up on Monday advised strict return precautions.  Swain diet avoid using the Zepbound medication [TB]      ED Course User Index  [NT] Best Lutz DO  [TB] DEANDRA Biswas         Diagnoses as of 05/10/25 2106   Left lower quadrant abdominal pain   Acute diverticulitis   Nausea and vomiting, unspecified vomiting type   Dehydration         This patient has remained hemodynamically stable during their ED course.      Critical Care: None      Counseling:  The emergency provider has spoken with the patient family and discussed today’s results, in addition to providing specific details for the plan of care and counseling regarding the diagnosis and prognosis.  Questions are answered at this time and they are agreeable with the plan.         --------------------------------- IMPRESSION AND DISPOSITION ---------------------------------    IMPRESSION  1. Left lower quadrant abdominal pain    2. Acute diverticulitis    3. Nausea and vomiting, unspecified vomiting type    4. Dehydration        DISPOSITION  Disposition: Discharge home  Patient condition is stable improved        NOTE: This report was transcribed using voice recognition software. Every effort was made to ensure accuracy; however, inadvertent computerized transcription errors may be present      DEANDRA Biswas  05/10/25 2106

## 2025-05-10 NOTE — DISCHARGE INSTRUCTIONS
Take antibiotics as prescribed, follow-up with your primary care physician to make an ED follow-up appointment for continued monitoring of your symptoms and to make sure that your symptoms are improving with treatment.  Return to the ED for any new or worsening symptoms including severe worsening of pain, inability to tolerate oral intake due to severe nausea and vomiting, large amounts of bloody stool, severe abdominal bloating or distention although which could be signs of worsening infection or complication and require immediate reassessment by physician in the emergency department.

## 2025-05-12 ENCOUNTER — APPOINTMENT (OUTPATIENT)
Dept: GASTROENTEROLOGY | Facility: CLINIC | Age: 65
End: 2025-05-12
Payer: MEDICARE

## 2025-05-12 DIAGNOSIS — K57.92 ACUTE DIVERTICULITIS: Primary | ICD-10-CM

## 2025-05-12 DIAGNOSIS — Z12.11 SPECIAL SCREENING FOR MALIGNANT NEOPLASMS, COLON: ICD-10-CM

## 2025-05-12 DIAGNOSIS — Z12.11 COLON CANCER SCREENING: ICD-10-CM

## 2025-05-12 PROCEDURE — 99204 OFFICE O/P NEW MOD 45 MIN: CPT | Performed by: INTERNAL MEDICINE

## 2025-05-12 RX ORDER — AMOXICILLIN AND CLAVULANATE POTASSIUM 875; 125 MG/1; MG/1
1 TABLET, FILM COATED ORAL 2 TIMES DAILY
Qty: 10 TABLET | Refills: 0 | Status: SHIPPED | OUTPATIENT
Start: 2025-05-12 | End: 2025-05-17

## 2025-05-12 NOTE — ASSESSMENT & PLAN NOTE
Patient had acute onset left lower quadrant pain 3 weeks ago  2 weeks ago she went to the ER-CT scan showed sigmoid diverticulitis without abscess or perforation  I reviewed the CT scan myself  She has been discharged with 7-day prescription of Augmentin    In baseline, patient endorses constipation.  She has straining as well as small pellet-like stools, [place] scale 1-2    I discussed the pathophysiology of diverticulosis and diverticulitis with the patient    - I will extend her antibiotics to a total of 10 days  - I recommended colonoscopy after 6 weeks.  Patient's last colonoscopy approximately age 46  - I also also advised her to increase her fiber intake, water intake and start a laxative like MiraLAX      Orders:    Referral to Gastroenterology    Colonoscopy Screening; Average Risk Patient; Future    amoxicillin-clavulanate (Augmentin) 875-125 mg tablet; Take 1 tablet by mouth 2 times a day for 5 days.

## 2025-05-12 NOTE — PROGRESS NOTES
Primary Care Provider: No primary care provider on file.  Referring Provider: Jaja Avendano APRN-C*  My final recommendations will be communicated back to the referring physician and/or primary care provider via shared medical records or via US mail.    Chief Complaint (Reason for visit):   Vandana Coppola is a 64 y.o. female who presents for follow-up of diverticulitis    ASSESSMENT AND PLAN     Assessment & Plan  Acute diverticulitis  Patient had acute onset left lower quadrant pain 3 weeks ago  2 weeks ago she went to the ER-CT scan showed sigmoid diverticulitis without abscess or perforation  I reviewed the CT scan myself  She has been discharged with 7-day prescription of Augmentin    In baseline, patient endorses constipation.  She has straining as well as small pellet-like stools, [place] scale 1-2    I discussed the pathophysiology of diverticulosis and diverticulitis with the patient    - I will extend her antibiotics to a total of 10 days  - I recommended colonoscopy after 6 weeks.  Patient's last colonoscopy approximately age 46  - I also also advised her to increase her fiber intake, water intake and start a laxative like MiraLAX      Orders:    Referral to Gastroenterology    Colonoscopy Screening; Average Risk Patient; Future    amoxicillin-clavulanate (Augmentin) 875-125 mg tablet; Take 1 tablet by mouth 2 times a day for 5 days.    Colon cancer screening    Orders:    Colonoscopy Screening; Average Risk Patient; Future      I discussed the risks, benefits and alternative(s) of the procedure(s) with the patient. Pt verbalizes understanding and is willing to proceed. All questions were answered.    Robert Ingram MD, MS    SUBJECTIVE   HPI: History obtained from patient  Patient had acute onset left lower quadrant pain 3 weeks ago  2 weeks ago she went to the ER-CT scan showed sigmoid diverticulitis without abscess or perforation  I reviewed the CT scan myself  She has been discharged with 7-day  prescription of Augmentin    In baseline, patient endorses constipation.  She has straining as well as small pellet-like stools, [place] scale 1-2    Medical History[1]    Surgical History[2]    Current Medications[3]    Allergies[4]  OBJECTIVE   PHYSICAL EXAM:  There were no vitals taken for this visit.     LABS/IMAGING/SCOPES  Lab Results   Component Value Date    WBC 15.6 (H) 05/10/2025    HGB 12.7 05/10/2025    HCT 37.9 05/10/2025    MCV 90 05/10/2025     05/10/2025     Lab Results   Component Value Date    GLUCOSE 100 (H) 05/10/2025    CALCIUM 9.4 05/10/2025     05/10/2025    K 3.2 (L) 05/10/2025    CO2 24 05/10/2025    CL 98 05/10/2025    BUN 12 05/10/2025    CREATININE 0.91 05/10/2025     Lab Results   Component Value Date    ALT 6 (L) 05/10/2025    AST 10 05/10/2025    ALKPHOS 64 05/10/2025    BILITOT 0.5 05/10/2025       === 05/10/25 ===    CT ABDOMEN PELVIS W IV CONTRAST    - Impression -  1.Findings compatible with acute diverticulitis involving the  distal descending colon. No extraluminal gas or abscess.  2.Moderate wall thickening of the colon extending from the distal  descending colon to the rectum. Findings suggestive of accompanying  segmental colitis.  Recommend follow-up colonoscopy once symptoms  resolve.  3.Mild hepatic steatosis.  Signed by MD Robert Varela MD, MS       [1]   Past Medical History:  Diagnosis Date    Acute candidiasis of vulva and vagina 03/21/2019    Yeast vaginitis    Allergic rhinosinusitis 04/20/2023    Anxiety disorder 04/20/2023    Encounter for blood-alcohol and blood-drug test 11/04/2016    Encounter for drug screening    H/O bilateral breast implants     Headache 04/20/2023    History of bilateral breast implants previously removed     Nicotine dependence 04/20/2023    Other specified cough 03/21/2019    Post-viral cough syndrome    Pain in thoracic spine 01/28/2016    Thoracic back pain    Personal history of other drug therapy  11/10/2014    History of influenza vaccination    Personal history of other specified conditions     History of nausea    Shortness of breath 2018    SOB (shortness of breath) on exertion   [2]   Past Surgical History:  Procedure Laterality Date     SECTION, CLASSIC  11/10/2014     Section    GALLBLADDER SURGERY  11/10/2014    Gallbladder Surgery    MANDIBLE SURGERY  11/10/2014    Jaw Surgery    NECK SURGERY  11/10/2014    Neck Surgery   [3]   Current Outpatient Medications   Medication Sig Dispense Refill    albuterol 90 mcg/actuation inhaler INHALE 2 PUFFS BY MOUTH 4 TIMES A DAY 18 g 1    alendronate (Fosamax) 70 mg tablet Take 1 tablet (70 mg) by mouth every 7 days. Take in the morning with a full glass of water, on an empty stomach, and do not take anything else by mouth or lie down for the next 30 min. 12 tablet 3    amoxicillin-clavulanate (Augmentin) 875-125 mg tablet Take 1 tablet by mouth 2 times a day for 5 days. 10 tablet 0    baclofen (Lioresal) 10 mg tablet Take 1 tablet (10 mg) by mouth 2 times a day. 180 tablet 1    estradiol (Estrace) 0.01 % (0.1 mg/gram) vaginal cream Insert 0.5 Applicatorfuls (2 g) into the vagina once daily. Apply to vagina nightly for 1 week then every Monday/Wednesday/Friday. 42.5 g 5    hydrOXYzine HCL (Atarax) 25 mg tablet Take 1 tablet (25 mg) by mouth 2 times a day. 180 tablet 1    ketorolac (Toradol) 10 mg tablet Take 1 tablet (10 mg) by mouth every 6 hours if needed for moderate pain (4 - 6) for up to 5 days. 20 tablet 0    meloxicam (Mobic) 15 mg tablet Take 1 tablet (15 mg) by mouth once daily. 90 tablet 2    ondansetron (Zofran) 4 mg tablet Take 1 tablet (4 mg) by mouth every 6 hours for 3 days. 12 tablet 0    rosuvastatin (Crestor) 10 mg tablet Take 1 tablet (10 mg) by mouth once daily. 100 tablet 3    traZODone (Desyrel) 300 mg tablet Take 1 tablet (300 mg) by mouth once daily at bedtime. 90 tablet 3    venlafaxine XR (Effexor-XR) 150 mg 24 hr  capsule Take 1 capsule (150 mg) by mouth once daily. Do not crush or chew. 90 capsule 1     No current facility-administered medications for this visit.   [4] No Known Allergies

## 2025-05-13 LAB — BACTERIA UR CULT: ABNORMAL

## 2025-05-13 RX ORDER — POLYETHYLENE GLYCOL 3350, SODIUM SULFATE ANHYDROUS, SODIUM BICARBONATE, SODIUM CHLORIDE, POTASSIUM CHLORIDE 236; 22.74; 6.74; 5.86; 2.97 G/4L; G/4L; G/4L; G/4L; G/4L
4 POWDER, FOR SOLUTION ORAL ONCE
Qty: 4000 ML | Refills: 0 | Status: SHIPPED | OUTPATIENT
Start: 2025-05-13 | End: 2025-05-13

## 2025-05-14 ENCOUNTER — TELEPHONE (OUTPATIENT)
Dept: PHARMACY | Facility: HOSPITAL | Age: 65
End: 2025-05-14
Payer: MEDICARE

## 2025-05-14 NOTE — PROGRESS NOTES
EDPD Note: Rapid Result Review    I reviewed Vandana Coppola 's chart regarding a positive urine culture/result that was taken during their recent emergency room visit. The patient was not told about these results prior to leaving the emergency department. Therefore, patient was contacted and given appropriate education.    Presented to ED on 5/10 with abdominal pain, n/v and loss of appetite. No urinary symptoms. Discharged on Augmentin bid x 7 days due to diverticulitis.   Today patient did not endorse urinary sx's. However, due to the diverticulitis; patient was counseled to complete course of abx as prescribed. Patient agreed to plan and had no further questions.      Susceptibility data from last 90 days.  Collected Specimen Info Organism Amikacin Amoxicillin/Clavulanate Ampicillin Ampicillin/Sulbactam Cefazolin Cefazolin (uncomplicated UTIs only) Ceftriaxone Ciprofloxacin Gentamicin Levofloxacin Nitrofurantoin   05/10/25 Urine from Clean Catch/Voided Escherichia coli  S  S  R  R  I  S  S  S  S  S  S     Collected Specimen Info Organism Piperacillin/Tazobactam Trimethoprim/Sulfamethoxazole   05/10/25 Urine from Clean Catch/Voided Escherichia coli  S  S     Admission on 05/10/2025, Discharged on 05/10/2025   Component Date Value Ref Range Status    WBC 05/10/2025 15.6 (H)  4.4 - 11.3 x10*3/uL Final    nRBC 05/10/2025 0.0  0.0 - 0.0 /100 WBCs Final    RBC 05/10/2025 4.21  4.00 - 5.20 x10*6/uL Final    Hemoglobin 05/10/2025 12.7  12.0 - 16.0 g/dL Final    Hematocrit 05/10/2025 37.9  36.0 - 46.0 % Final    MCV 05/10/2025 90  80 - 100 fL Final    MCH 05/10/2025 30.2  26.0 - 34.0 pg Final    MCHC 05/10/2025 33.5  32.0 - 36.0 g/dL Final    RDW 05/10/2025 12.3  11.5 - 14.5 % Final    Platelets 05/10/2025 291  150 - 450 x10*3/uL Final    Neutrophils % 05/10/2025 76.8  40.0 - 80.0 % Final    Immature Granulocytes %, Automated 05/10/2025 0.4  0.0 - 0.9 % Final    Immature Granulocyte Count (IG) includes promyelocytes,  myelocytes and metamyelocytes but does not include bands. Percent differential counts (%) should be interpreted in the context of the absolute cell counts (cells/UL).    Lymphocytes % 05/10/2025 16.1  13.0 - 44.0 % Final    Monocytes % 05/10/2025 6.0  2.0 - 10.0 % Final    Eosinophils % 05/10/2025 0.3  0.0 - 6.0 % Final    Basophils % 05/10/2025 0.4  0.0 - 2.0 % Final    Neutrophils Absolute 05/10/2025 12.00 (H)  1.20 - 7.70 x10*3/uL Final    Percent differential counts (%) should be interpreted in the context of the absolute cell counts (cells/uL).    Immature Granulocytes Absolute, Au* 05/10/2025 0.07  0.00 - 0.70 x10*3/uL Final    Lymphocytes Absolute 05/10/2025 2.51  1.20 - 4.80 x10*3/uL Final    Monocytes Absolute 05/10/2025 0.93  0.10 - 1.00 x10*3/uL Final    Eosinophils Absolute 05/10/2025 0.04  0.00 - 0.70 x10*3/uL Final    Basophils Absolute 05/10/2025 0.06  0.00 - 0.10 x10*3/uL Final    Glucose 05/10/2025 100 (H)  74 - 99 mg/dL Final    Sodium 05/10/2025 136  136 - 145 mmol/L Final    Potassium 05/10/2025 3.2 (L)  3.5 - 5.3 mmol/L Final    Chloride 05/10/2025 98  98 - 107 mmol/L Final    Bicarbonate 05/10/2025 24  21 - 32 mmol/L Final    Anion Gap 05/10/2025 17  10 - 20 mmol/L Final    Urea Nitrogen 05/10/2025 12  6 - 23 mg/dL Final    Creatinine 05/10/2025 0.91  0.50 - 1.05 mg/dL Final    eGFR 05/10/2025 71  >60 mL/min/1.73m*2 Final    Calculations of estimated GFR are performed using the 2021 CKD-EPI Study Refit equation without the race variable for the IDMS-Traceable creatinine methods.  https://jasn.asnjournals.org/content/early/2021/09/22/ASN.3744808707    Calcium 05/10/2025 9.4  8.6 - 10.3 mg/dL Final    Albumin 05/10/2025 4.4  3.4 - 5.0 g/dL Final    Alkaline Phosphatase 05/10/2025 64  33 - 136 U/L Final    Total Protein 05/10/2025 7.6  6.4 - 8.2 g/dL Final    AST 05/10/2025 10  9 - 39 U/L Final    Bilirubin, Total 05/10/2025 0.5  0.0 - 1.2 mg/dL Final    ALT 05/10/2025 6 (L)  7 - 45 U/L Final     Patients treated with Sulfasalazine may generate falsely decreased results for ALT.    Magnesium 05/10/2025 1.96  1.60 - 2.40 mg/dL Final    Troponin I, High Sensitivity 05/10/2025 4  0 - 13 ng/L Final    Color, Urine 05/10/2025 Yellow  Straw, Yellow Final    Appearance, Urine 05/10/2025 Cloudy (N)  Clear Final    Specific Gravity, Urine 05/10/2025 1.025  1.005 - 1.035 Final    pH, Urine 05/10/2025 5.5  5.0, 5.5, 6.0, 6.5, 7.0, 7.5, 8.0 Final    Protein, Urine 05/10/2025 100 (2+) (A)  NEGATIVE, TRACE mg/dL Final    Glucose, Urine 05/10/2025 100 (1+) (A)  NEGATIVE mg/dL Final    Blood, Urine 05/10/2025 SMALL (1+) (A)  NEGATIVE mg/dL Final    Ketones, Urine 05/10/2025 >=80 (3+) (A)  NEGATIVE mg/dL Final    Bilirubin, Urine 05/10/2025 LARGE (3+) (A)  NEGATIVE mg/dL Final    Urobilinogen, Urine 05/10/2025 1.0  0.2, 1.0 mg/dL Final    Nitrite, Urine 05/10/2025 NEGATIVE  NEGATIVE Final    Leukocyte Esterase, Urine 05/10/2025 SMALL (1+) (A)  NEGATIVE Final    Extra Tube 05/10/2025 Hold for add-ons.   Final    Auto resulted.    Troponin I, High Sensitivity 05/10/2025 3  0 - 13 ng/L Final    WBC, Urine 05/10/2025 6-10 (A)  1-5, NONE /HPF Final    RBC, Urine 05/10/2025 1-2  NONE, 1-2, 3-5 /HPF Final    Squamous Epithelial Cells, Urine 05/10/2025 10-25 (FEW)  Reference range not established. /HPF Final    Transitional Epithelial Cells, Uri* 05/10/2025 1-2 (FEW)  Reference range not established. /HPF Final    Bacteria, Urine 05/10/2025 1+ (A)  NONE SEEN /HPF Final    Mucus, Urine 05/10/2025 2+  Reference range not established. /LPF Final    Hyaline Casts, Urine 05/10/2025 1+ (A)  NONE /LPF Final    Lactate 05/10/2025 0.9  0.4 - 2.0 mmol/L Final    Urine Culture 05/10/2025 20,000 - 80,000 CFU/mL Escherichia coli (A)   Final    Plus growth of clinically insignificant bacterial jeanette.       No further follow up needed from EDPD Team.     If there are any other questions for the ED Post-Discharge Culture Follow Up Team, please  contact [phone]. Fax: [phone].    Wilber PreciadoD

## 2025-05-15 ENCOUNTER — APPOINTMENT (OUTPATIENT)
Dept: CARDIOLOGY | Facility: HOSPITAL | Age: 65
End: 2025-05-15
Payer: MEDICARE

## 2025-05-15 ENCOUNTER — APPOINTMENT (OUTPATIENT)
Dept: RADIOLOGY | Facility: HOSPITAL | Age: 65
End: 2025-05-15
Payer: MEDICARE

## 2025-05-15 ENCOUNTER — HOSPITAL ENCOUNTER (EMERGENCY)
Facility: HOSPITAL | Age: 65
Discharge: HOME | End: 2025-05-15
Attending: EMERGENCY MEDICINE
Payer: MEDICARE

## 2025-05-15 VITALS
SYSTOLIC BLOOD PRESSURE: 136 MMHG | HEIGHT: 64 IN | RESPIRATION RATE: 18 BRPM | TEMPERATURE: 98.1 F | HEART RATE: 95 BPM | BODY MASS INDEX: 25.82 KG/M2 | OXYGEN SATURATION: 96 % | DIASTOLIC BLOOD PRESSURE: 94 MMHG | WEIGHT: 151.24 LBS

## 2025-05-15 DIAGNOSIS — I10 DIASTOLIC HYPERTENSION: ICD-10-CM

## 2025-05-15 DIAGNOSIS — N39.0 ACUTE LOWER URINARY TRACT INFECTION: ICD-10-CM

## 2025-05-15 DIAGNOSIS — K59.00 CONSTIPATION, UNSPECIFIED CONSTIPATION TYPE: Primary | ICD-10-CM

## 2025-05-15 DIAGNOSIS — R11.2 NAUSEA AND VOMITING, UNSPECIFIED VOMITING TYPE: ICD-10-CM

## 2025-05-15 DIAGNOSIS — R10.32 LEFT LOWER QUADRANT ABDOMINAL PAIN: ICD-10-CM

## 2025-05-15 LAB
ALBUMIN SERPL BCP-MCNC: 4.2 G/DL (ref 3.4–5)
ALP SERPL-CCNC: 49 U/L (ref 33–136)
ALT SERPL W P-5'-P-CCNC: 7 U/L (ref 7–45)
ANION GAP SERPL CALCULATED.3IONS-SCNC: 14 MMOL/L (ref 10–20)
APPEARANCE UR: ABNORMAL
AST SERPL W P-5'-P-CCNC: 10 U/L (ref 9–39)
BACTERIA #/AREA URNS AUTO: ABNORMAL /HPF
BASOPHILS # BLD AUTO: 0.04 X10*3/UL (ref 0–0.1)
BASOPHILS NFR BLD AUTO: 0.3 %
BILIRUB SERPL-MCNC: 0.3 MG/DL (ref 0–1.2)
BILIRUB UR STRIP.AUTO-MCNC: NEGATIVE MG/DL
BUN SERPL-MCNC: 11 MG/DL (ref 6–23)
CALCIUM SERPL-MCNC: 9.4 MG/DL (ref 8.6–10.3)
CARDIAC TROPONIN I PNL SERPL HS: <3 NG/L (ref 0–13)
CARDIAC TROPONIN I PNL SERPL HS: <3 NG/L (ref 0–13)
CHLORIDE SERPL-SCNC: 104 MMOL/L (ref 98–107)
CO2 SERPL-SCNC: 27 MMOL/L (ref 21–32)
COLOR UR: YELLOW
CREAT SERPL-MCNC: 0.85 MG/DL (ref 0.5–1.05)
EGFRCR SERPLBLD CKD-EPI 2021: 77 ML/MIN/1.73M*2
EOSINOPHIL # BLD AUTO: 0.07 X10*3/UL (ref 0–0.7)
EOSINOPHIL NFR BLD AUTO: 0.6 %
ERYTHROCYTE [DISTWIDTH] IN BLOOD BY AUTOMATED COUNT: 12.3 % (ref 11.5–14.5)
GLUCOSE SERPL-MCNC: 96 MG/DL (ref 74–99)
GLUCOSE UR STRIP.AUTO-MCNC: NORMAL MG/DL
HCT VFR BLD AUTO: 36 % (ref 36–46)
HGB BLD-MCNC: 12 G/DL (ref 12–16)
HYALINE CASTS #/AREA URNS AUTO: ABNORMAL /LPF
IMM GRANULOCYTES # BLD AUTO: 0.08 X10*3/UL (ref 0–0.7)
IMM GRANULOCYTES NFR BLD AUTO: 0.6 % (ref 0–0.9)
KETONES UR STRIP.AUTO-MCNC: ABNORMAL MG/DL
LEUKOCYTE ESTERASE UR QL STRIP.AUTO: ABNORMAL
LIPASE SERPL-CCNC: 51 U/L (ref 9–82)
LYMPHOCYTES # BLD AUTO: 1.6 X10*3/UL (ref 1.2–4.8)
LYMPHOCYTES NFR BLD AUTO: 12.8 %
MCH RBC QN AUTO: 30.2 PG (ref 26–34)
MCHC RBC AUTO-ENTMCNC: 33.3 G/DL (ref 32–36)
MCV RBC AUTO: 91 FL (ref 80–100)
MONOCYTES # BLD AUTO: 0.76 X10*3/UL (ref 0.1–1)
MONOCYTES NFR BLD AUTO: 6.1 %
MUCOUS THREADS #/AREA URNS AUTO: ABNORMAL /LPF
NEUTROPHILS # BLD AUTO: 9.95 X10*3/UL (ref 1.2–7.7)
NEUTROPHILS NFR BLD AUTO: 79.6 %
NITRITE UR QL STRIP.AUTO: NEGATIVE
NRBC BLD-RTO: 0 /100 WBCS (ref 0–0)
PH UR STRIP.AUTO: 6 [PH]
PLATELET # BLD AUTO: 348 X10*3/UL (ref 150–450)
POTASSIUM SERPL-SCNC: 3.9 MMOL/L (ref 3.5–5.3)
PROT SERPL-MCNC: 6.8 G/DL (ref 6.4–8.2)
PROT UR STRIP.AUTO-MCNC: ABNORMAL MG/DL
RBC # BLD AUTO: 3.97 X10*6/UL (ref 4–5.2)
RBC # UR STRIP.AUTO: ABNORMAL MG/DL
RBC #/AREA URNS AUTO: ABNORMAL /HPF
SODIUM SERPL-SCNC: 141 MMOL/L (ref 136–145)
SP GR UR STRIP.AUTO: 1.02
SQUAMOUS #/AREA URNS AUTO: ABNORMAL /HPF
UROBILINOGEN UR STRIP.AUTO-MCNC: NORMAL MG/DL
WBC # BLD AUTO: 12.5 X10*3/UL (ref 4.4–11.3)
WBC #/AREA URNS AUTO: ABNORMAL /HPF

## 2025-05-15 PROCEDURE — 74177 CT ABD & PELVIS W/CONTRAST: CPT | Mod: FOREIGN READ | Performed by: RADIOLOGY

## 2025-05-15 PROCEDURE — 84484 ASSAY OF TROPONIN QUANT: CPT | Performed by: EMERGENCY MEDICINE

## 2025-05-15 PROCEDURE — 83690 ASSAY OF LIPASE: CPT | Performed by: EMERGENCY MEDICINE

## 2025-05-15 PROCEDURE — 84075 ASSAY ALKALINE PHOSPHATASE: CPT | Performed by: EMERGENCY MEDICINE

## 2025-05-15 PROCEDURE — 93005 ELECTROCARDIOGRAM TRACING: CPT

## 2025-05-15 PROCEDURE — 74177 CT ABD & PELVIS W/CONTRAST: CPT

## 2025-05-15 PROCEDURE — 81001 URINALYSIS AUTO W/SCOPE: CPT | Performed by: EMERGENCY MEDICINE

## 2025-05-15 PROCEDURE — 87086 URINE CULTURE/COLONY COUNT: CPT | Mod: TRILAB | Performed by: EMERGENCY MEDICINE

## 2025-05-15 PROCEDURE — 2550000001 HC RX 255 CONTRASTS: Performed by: EMERGENCY MEDICINE

## 2025-05-15 PROCEDURE — 85025 COMPLETE CBC W/AUTO DIFF WBC: CPT | Performed by: EMERGENCY MEDICINE

## 2025-05-15 PROCEDURE — 2500000004 HC RX 250 GENERAL PHARMACY W/ HCPCS (ALT 636 FOR OP/ED): Performed by: EMERGENCY MEDICINE

## 2025-05-15 PROCEDURE — 36415 COLL VENOUS BLD VENIPUNCTURE: CPT | Performed by: EMERGENCY MEDICINE

## 2025-05-15 PROCEDURE — 96375 TX/PRO/DX INJ NEW DRUG ADDON: CPT

## 2025-05-15 PROCEDURE — 99285 EMERGENCY DEPT VISIT HI MDM: CPT | Mod: 25 | Performed by: EMERGENCY MEDICINE

## 2025-05-15 PROCEDURE — 96374 THER/PROPH/DIAG INJ IV PUSH: CPT

## 2025-05-15 PROCEDURE — 96361 HYDRATE IV INFUSION ADD-ON: CPT

## 2025-05-15 RX ORDER — KETOROLAC TROMETHAMINE 15 MG/ML
15 INJECTION, SOLUTION INTRAMUSCULAR; INTRAVENOUS ONCE
Status: COMPLETED | OUTPATIENT
Start: 2025-05-15 | End: 2025-05-15

## 2025-05-15 RX ORDER — ONDANSETRON 4 MG/1
4 TABLET, ORALLY DISINTEGRATING ORAL EVERY 8 HOURS PRN
Qty: 10 TABLET | Refills: 0 | Status: SHIPPED | OUTPATIENT
Start: 2025-05-15 | End: 2025-05-18

## 2025-05-15 RX ORDER — ONDANSETRON HYDROCHLORIDE 2 MG/ML
4 INJECTION, SOLUTION INTRAVENOUS ONCE
Status: COMPLETED | OUTPATIENT
Start: 2025-05-15 | End: 2025-05-15

## 2025-05-15 RX ORDER — FAMOTIDINE 10 MG/ML
20 INJECTION, SOLUTION INTRAVENOUS ONCE
Status: COMPLETED | OUTPATIENT
Start: 2025-05-15 | End: 2025-05-15

## 2025-05-15 RX ORDER — IBUPROFEN 600 MG/1
600 TABLET, FILM COATED ORAL EVERY 6 HOURS PRN
Qty: 20 TABLET | Refills: 0 | Status: SHIPPED | OUTPATIENT
Start: 2025-05-15 | End: 2025-05-20

## 2025-05-15 RX ORDER — POLYETHYLENE GLYCOL 3350 17 G/17G
17 POWDER, FOR SOLUTION ORAL DAILY
Qty: 510 G | Refills: 0 | Status: SHIPPED | OUTPATIENT
Start: 2025-05-15 | End: 2025-06-14

## 2025-05-15 RX ADMIN — IOHEXOL 75 ML: 350 INJECTION, SOLUTION INTRAVENOUS at 11:57

## 2025-05-15 RX ADMIN — SODIUM CHLORIDE 1000 ML: 900 INJECTION, SOLUTION INTRAVENOUS at 11:02

## 2025-05-15 RX ADMIN — FAMOTIDINE 20 MG: 10 INJECTION, SOLUTION INTRAVENOUS at 11:00

## 2025-05-15 RX ADMIN — KETOROLAC TROMETHAMINE 15 MG: 15 INJECTION, SOLUTION INTRAMUSCULAR; INTRAVENOUS at 11:00

## 2025-05-15 RX ADMIN — ONDANSETRON 4 MG: 2 INJECTION, SOLUTION INTRAMUSCULAR; INTRAVENOUS at 11:00

## 2025-05-15 ASSESSMENT — PAIN - FUNCTIONAL ASSESSMENT
PAIN_FUNCTIONAL_ASSESSMENT: 0-10

## 2025-05-15 ASSESSMENT — COLUMBIA-SUICIDE SEVERITY RATING SCALE - C-SSRS
1. IN THE PAST MONTH, HAVE YOU WISHED YOU WERE DEAD OR WISHED YOU COULD GO TO SLEEP AND NOT WAKE UP?: NO
6. HAVE YOU EVER DONE ANYTHING, STARTED TO DO ANYTHING, OR PREPARED TO DO ANYTHING TO END YOUR LIFE?: NO
2. HAVE YOU ACTUALLY HAD ANY THOUGHTS OF KILLING YOURSELF?: NO

## 2025-05-15 ASSESSMENT — PAIN SCALES - GENERAL
PAINLEVEL_OUTOF10: 0 - NO PAIN
PAINLEVEL_OUTOF10: 7
PAINLEVEL_OUTOF10: 0 - NO PAIN
PAINLEVEL_OUTOF10: 5 - MODERATE PAIN

## 2025-05-15 ASSESSMENT — PAIN DESCRIPTION - LOCATION: LOCATION: ABDOMEN

## 2025-05-15 ASSESSMENT — PAIN DESCRIPTION - PROGRESSION: CLINICAL_PROGRESSION: NOT CHANGED

## 2025-05-15 ASSESSMENT — PAIN DESCRIPTION - PAIN TYPE: TYPE: ACUTE PAIN

## 2025-05-15 ASSESSMENT — PAIN DESCRIPTION - FREQUENCY: FREQUENCY: CONSTANT/CONTINUOUS

## 2025-05-15 ASSESSMENT — PAIN DESCRIPTION - ONSET: ONSET: SUDDEN

## 2025-05-15 ASSESSMENT — PAIN DESCRIPTION - ORIENTATION: ORIENTATION: LEFT;LOWER

## 2025-05-15 ASSESSMENT — PAIN DESCRIPTION - DESCRIPTORS
DESCRIPTORS: CRAMPING;SHARP
DESCRIPTORS: CRAMPING

## 2025-05-15 NOTE — DISCHARGE INSTRUCTIONS
Follow-up with your primary care physician within 1 to 2 days for further management of your current symptoms, repeat check of your blood pressure, and review of the urine culture results.    Follow-up with gastroenterology within 1 to 2 days for further management of your abdominal pain and diverticulitis      Return to the emergency department sooner with worsening of symptoms or onset of new symptoms

## 2025-05-15 NOTE — Clinical Note
Vandana Coppola was seen and treated in our emergency department on 5/15/2025.  She may return to work on 05/17/2025.       If you have any questions or concerns, please don't hesitate to call.      Mireya Tello MD

## 2025-05-15 NOTE — ED PROVIDER NOTES
Department of Emergency Medicine   ED  Provider Note  Admit Date/RoomTime: 5/15/2025 10:37 AM  ED Room: AC05/AC05        History of Present Illness:  Chief Complaint   Patient presents with    Constipation     For the past 2 weeks I have had nausea vomiting and cramping in my llq I have not had any bm          Vandana Coppola is a 64 y.o. female history of arthritis depression attention deficit disorder, history recently seen evaluated in the emergency department by myself diagnosed with diverticulitis.  Placed on Augmentin CT negative for obstruction or perforation at that time on 5/10/2025.  Patient reported initially she was having difficulty having bowel movements nausea and vomiting having difficulty eating because she felt full was seen evaluated by primary care referral to gastroenterology still has not had a bowel movement primary care office advised MiraLAX high-fiber intake increase water.  Patient states she is still not had a bowel movement and having worsening abdominal discomfort bloating nausea.  Review of Systems:   Pertinent positives and negatives are stated within HPI, all other systems reviewed and are negative.        --------------------------------------------- PAST HISTORY ---------------------------------------------  Past Medical History:  has a past medical history of Acute candidiasis of vulva and vagina (03/21/2019), Allergic rhinosinusitis (04/20/2023), Anxiety disorder (04/20/2023), Encounter for blood-alcohol and blood-drug test (11/04/2016), H/O bilateral breast implants, Headache (04/20/2023), History of bilateral breast implants previously removed, Nicotine dependence (04/20/2023), Other specified cough (03/21/2019), Pain in thoracic spine (01/28/2016), Personal history of other drug therapy (11/10/2014), Personal history of other specified conditions, and Shortness of breath (03/27/2018).    She has no past medical history of Personal history of irradiation.  Past Surgical  History:  has a past surgical history that includes Neck surgery (11/10/2014); Gallbladder surgery (11/10/2014);  section, classic (11/10/2014); and Mandible surgery (11/10/2014).  Social History:  reports that she quit smoking about 6 years ago. Her smoking use included cigarettes. She has never used smokeless tobacco. She reports that she does not currently use alcohol. She reports that she does not use drugs.  Family History: family history includes Atrial fibrillation in her mother; Heart failure in her mother; gi bleed in her mother.. Unless otherwise noted, family history is non contributory  The patient’s home medications have been reviewed.  Allergies: Patient has no known allergies.        ---------------------------------------------------PHYSICAL EXAM--------------------------------------    GENERAL APPEARANCE: Awake and alert.   VITAL SIGNS: As per the nurses' triage record.  Elevated blood pressure  HEENT: Normocephalic, atraumatic. Extraocular muscles are intact. Pupils equal round and reactive to light. Conjunctiva are pink. Negative scleral icterus. Mucous membranes are moist. Tongue in the midline. Pharynx was without erythema or exudates, uvula midline  NECK: Soft Nontender and supple, full gross ROM, no meningeal signs.  CHEST: Nontender to palpation. Clear to auscultation bilaterally. No rales, rhonchi, or wheezing.   HEART: S1, S2. Regular rate and rhythm. No murmurs, gallops or rubs.  Strong and equal pulses in the extremities.   ABDOMEN: Soft, diffuse tenderness semiformed nondistended, positive bowel sounds, no palpable masses.  Back: No pain palpation of thoracic lumbar spine no step-offs crepitus or bruising no CVA tenderness  MUSCULCSKELETAL: Full gross active range of motion. Ambulating on own with no acute difficulties  NEUROLOGICAL: Awake, alert and oriented x 3. Power intact in the upper and lower extremities. Sensation is intact to light touch in the upper and lower  "extremities.   IMMUNOLOGICAL: No lymphatic streaking noted   DERM: No petechiae, rashes, or ecchymoses.          ------------------------- NURSING NOTES AND VITALS REVIEWED ---------------------------  The nursing notes within the ED encounter and vital signs as below have been reviewed by myself  BP (!) 136/94 (BP Location: Left arm, Patient Position: Sitting)   Pulse 95   Temp 36.7 °C (98.1 °F) (Temporal)   Resp 18   Ht 1.626 m (5' 4\")   Wt 68.6 kg (151 lb 3.8 oz)   SpO2 96%   BMI 25.96 kg/m²     Oxygen Saturation Interpretation: 96% room air    The cardiac monitor revealed sinus rhythm with a heart rate in the 90s as interpreted by me. The cardiac monitor was ordered secondary to the patient's heart rate and to monitor the patient for dysrhythmia.       The patient’s available past medical records and past encounters were reviewed.          -----------------------DIAGNOSTIC RESULTS------------------------  LABS:    Labs Reviewed   CBC WITH AUTO DIFFERENTIAL - Abnormal       Result Value    WBC 12.5 (*)     nRBC 0.0      RBC 3.97 (*)     Hemoglobin 12.0      Hematocrit 36.0      MCV 91      MCH 30.2      MCHC 33.3      RDW 12.3      Platelets 348      Neutrophils % 79.6      Immature Granulocytes %, Automated 0.6      Lymphocytes % 12.8      Monocytes % 6.1      Eosinophils % 0.6      Basophils % 0.3      Neutrophils Absolute 9.95 (*)     Immature Granulocytes Absolute, Automated 0.08      Lymphocytes Absolute 1.60      Monocytes Absolute 0.76      Eosinophils Absolute 0.07      Basophils Absolute 0.04     URINALYSIS WITH REFLEX CULTURE AND MICROSCOPIC - Abnormal    Color, Urine Yellow      Appearance, Urine Turbid (*)     Specific Gravity, Urine 1.018      pH, Urine 6.0      Protein, Urine 10 (TRACE)      Glucose, Urine Normal      Blood, Urine 0.03 (TRACE) (*)     Ketones, Urine 10 (1+) (*)     Bilirubin, Urine NEGATIVE      Urobilinogen, Urine Normal      Nitrite, Urine NEGATIVE      Leukocyte Esterase, " Urine 500 Tutu/uL (*)    MICROSCOPIC ONLY, URINE - Abnormal    WBC, Urine 21-50 (*)     RBC, Urine 3-5      Squamous Epithelial Cells, Urine 10-25 (FEW)      Bacteria, Urine 1+ (*)     Mucus, Urine 4+      Hyaline Casts, Urine 2+ (*)    COMPREHENSIVE METABOLIC PANEL - Normal    Glucose 96      Sodium 141      Potassium 3.9      Chloride 104      Bicarbonate 27      Anion Gap 14      Urea Nitrogen 11      Creatinine 0.85      eGFR 77      Calcium 9.4      Albumin 4.2      Alkaline Phosphatase 49      Total Protein 6.8      AST 10      Bilirubin, Total 0.3      ALT 7     LIPASE - Normal    Lipase 51      Narrative:     Venipuncture immediately after or during the administration of Metamizole may lead to falsely low results. Testing should be performed immediately prior to Metamizole dosing.   SERIAL TROPONIN-INITIAL - Normal    Troponin I, High Sensitivity <3      Narrative:     Less than 99th percentile of normal range cutoff-  Female and children under 18 years old <14 ng/L; Male <21 ng/L: Negative  Repeat testing should be performed if clinically indicated.     Female and children under 18 years old 14-50 ng/L; Male 21-50 ng/L:  Consistent with possible cardiac damage and possible increased clinical   risk. Serial measurements may help to assess extent of myocardial damage.     >50 ng/L: Consistent with cardiac damage, increased clinical risk and  myocardial infarction. Serial measurements may help assess extent of   myocardial damage.      NOTE: Children less than 1 year old may have higher baseline troponin   levels and results should be interpreted in conjunction with the overall   clinical context.     NOTE: Troponin I testing is performed using a different   testing methodology at [facility] than at other   [facility]. Direct result comparisons should only   be made within the same method.   SERIAL TROPONIN, 1 HOUR - Normal    Troponin I, High Sensitivity <3      Narrative:     Less than  99th percentile of normal range cutoff-  Female and children under 18 years old <14 ng/L; Male <21 ng/L: Negative  Repeat testing should be performed if clinically indicated.     Female and children under 18 years old 14-50 ng/L; Male 21-50 ng/L:  Consistent with possible cardiac damage and possible increased clinical   risk. Serial measurements may help to assess extent of myocardial damage.     >50 ng/L: Consistent with cardiac damage, increased clinical risk and  myocardial infarction. Serial measurements may help assess extent of   myocardial damage.      NOTE: Children less than 1 year old may have higher baseline troponin   levels and results should be interpreted in conjunction with the overall   clinical context.     NOTE: Troponin I testing is performed using a different   testing methodology at [facility] than at other   [facility]. Direct result comparisons should only   be made within the same method.   URINE CULTURE   TROPONIN SERIES- (INITIAL, 1 HR)    Narrative:     The following orders were created for panel order Troponin I Series, High Sensitivity (0, 1 HR).  Procedure                               Abnormality         Status                     ---------                               -----------         ------                     Troponin I, High Sensiti...[[id number]]  Normal              Final result               Troponin, High Sensitivi...[[id number]]  Normal              Final result                 Please view results for these tests on the individual orders.   URINALYSIS WITH REFLEX CULTURE AND MICROSCOPIC    Narrative:     The following orders were created for panel order Urinalysis with Reflex Culture and Microscopic.  Procedure                               Abnormality         Status                     ---------                               -----------         ------                     Urinalysis with Reflex C...[[id number]]  Abnormal            Final result                Extra Urine Gray Tube[[id number]]                                                         Please view results for these tests on the individual orders.   EXTRA URINE GRAY TUBE       As interpreted by me, the above displayed labs are abnormal. All other labs obtained during this visit were within normal range or not returned as of this dictation.      EKG Interpretation    Normal sinus rhythm with sinus arrhythmia  Right atrial enlargement  Low voltage QRS  Nonspecific T wave abnormality  Abnormal ECG  No previous ECGs available        CT abdomen pelvis w IV contrast   Final Result   1.Findings compatible with acute diverticulitis involving the   distal descending colon similar compared to 5/10/2025. No extraluminal   gas or abscess.   2.Moderate wall thickening of the colon extending from the distal   descending colon to the rectum similar compared to prior. No discrete   underlying mass identified. Findings suggestive of accompanying   segmental colitis.  Recommend follow-up colonoscopy once symptoms   resolve to exclude underlying neoplastic process.   3.Moderate formed stool in the more proximal right hemicolon and   transverse colon.   4.Mild hepatic steatosis.   Signed by Jesus Manuel Perez MD              CT abdomen pelvis w IV contrast   Final Result   1.Findings compatible with acute diverticulitis involving the   distal descending colon similar compared to 5/10/2025. No extraluminal   gas or abscess.   2.Moderate wall thickening of the colon extending from the distal   descending colon to the rectum similar compared to prior. No discrete   underlying mass identified. Findings suggestive of accompanying   segmental colitis.  Recommend follow-up colonoscopy once symptoms   resolve to exclude underlying neoplastic process.   3.Moderate formed stool in the more proximal right hemicolon and   transverse colon.   4.Mild hepatic steatosis.   Signed by Jesus Manuel Perez MD              ------------------------------ ED  COURSE/MEDICAL DECISION MAKING----------------------  Medical Decision Making:   Exam: A medically appropriate exam performed, outlined above, given the known history and presentation.    History obtained from: Review of medical nursing notes patient      Social Determinants of Health considered during this visit: Takes care of her self at home presents with       PAST MEDICAL HISTORY/Chronic Conditions Affecting Care     has a past medical history of Acute candidiasis of vulva and vagina (03/21/2019), Allergic rhinosinusitis (04/20/2023), Anxiety disorder (04/20/2023), Encounter for blood-alcohol and blood-drug test (11/04/2016), H/O bilateral breast implants, Headache (04/20/2023), History of bilateral breast implants previously removed, Nicotine dependence (04/20/2023), Other specified cough (03/21/2019), Pain in thoracic spine (01/28/2016), Personal history of other drug therapy (11/10/2014), Personal history of other specified conditions, and Shortness of breath (03/27/2018).       CC/HPI Summary, Social Determinants of health, Records Reviewed, DDx, testing done/not done, ED Course, Reassessment, disposition considerations/shared decision making with patient, consults, disposition:   Presents with no bowel movement continued abdominal pain nausea vomiting recent diagnosis of diverticulitis placed on Augmentin  Plan  Toradol, Zofran, normal saline, EKG, CBC, CMP, lipase, troponin, urine, CT abdomen pelvis, Pepcid    Medical Decision Making/Differential Diagnosis:  Differentials include but not limited to obstruction versus perforation versus constipation versus fecal impaction versus biliary colic versus reflux versus UTI patient did have an abnormal urine but no urinary symptoms  Review  Glucose 96  Electrolytes unremarkable  Normal renal function  Normal LFTs  Lipase 51  White blood cell count 12.5  Hemoglobin 12  Troponin less than 3 repeat troponin less than 3  Urine showed leuk esterase WBCs 21-50+1  bacteria culture was positive couple of days ago for   20,000 - 80,000 CFU/mL Escherichia coli Abnormal    Susceptible  to Augmentin no urinary symptoms  Patient presented to the emergency department with inability to have a bowel movement currently being treated for diverticulitis.  Patient is not hypoglycemic.  Electrolytes unremarkable.  Normal renal function.  Normal LFTs.  Lipase normal.  White blood cell count 12.5 improved from 5 days ago hemoglobin 12.  Urine abnormal with leuk esterase WBCs culture positive couple of days ago on Augmentin for diverticulitis.  EKG per attending note no ST elevation with sinus arrhythmia.  No complaints of chest pain repeat CT abdomen pelvis showed  1.Findings compatible with acute diverticulitis involving the  distal descending colon similar compared to 5/10/2025. No extraluminal  gas or abscess.  2.Moderate wall thickening of the colon extending from the distal  descending colon to the rectum similar compared to prior. No discrete  underlying mass identified. Findings suggestive of accompanying  segmental colitis.  Recommend follow-up colonoscopy once symptoms  resolve to exclude underlying neoplastic process.  3.Moderate formed stool in the more proximal right hemicolon and  transverse colon.  4.Mild hepatic steatosis.  Based on patient's clinical presentation history and symptoms consistent with constipation MiraLAX  Lower quadrant pain  Lower UTI infection  Nausea vomiting Zofran  Discharge per attending note patient seen and evaluated with attending physician Dr. Tello no signs of sepsis or toxicity blood pressure noted be elevated advise close follow-up with primary care return with any worsening symptoms or concerns  Patient amenable plan amenable   Based on patient clinical presentation's history and symptoms consistent with acute diverticulitis continue with current plan of care moderate wall thickening of the colon patient has been following with her primary care  physician scheduled for recommended colonoscopy formed stool in the proximal right hemicolon and transverse colon started on MiraLAX no abscess or obstruction noted.  Mild hepatic status noted.  CMT for discharge would like to discharge planning no signs of sepsis or toxicity patient is not hypotensive tachycardic or febrile  PROCEDURES  Unless otherwise noted below, none      CONSULTS:   None      ED Course as of 05/15/25 1939   Thu May 15, 2025   1247 Leukocyte Esterase, Urine(!): 500 Tutu/uL  Patient currently on Augmentin culture reviewed susceptible to the antibiotic.  No urinary symptoms [TB]      ED Course User Index  [TB] MARILEE Biswas-CNP         Diagnoses as of 05/15/25 1939   Constipation, unspecified constipation type   Left lower quadrant abdominal pain   Acute lower urinary tract infection   Diastolic hypertension   Nausea and vomiting, unspecified vomiting type         This patient has remained hemodynamically stable during their ED course.      Critical Care: none        Counseling:  The emergency provider has spoken with the patient and discussed today’s results, in addition to providing specific details for the plan of care and counseling regarding the diagnosis and prognosis.  Questions are answered at this time and they are agreeable with the plan.         --------------------------------- IMPRESSION AND DISPOSITION ---------------------------------    IMPRESSION  1. Constipation, unspecified constipation type    2. Left lower quadrant abdominal pain    3. Acute lower urinary tract infection    4. Diastolic hypertension    5. Nausea and vomiting, unspecified vomiting type        DISPOSITION  Disposition: Discharge home   Patient condition is stable        NOTE: This report was transcribed using voice recognition software. Every effort was made to ensure accuracy; however, inadvertent computerized transcription errors may be present      DEANDRA Biswas  05/15/25 1940

## 2025-05-15 NOTE — PROGRESS NOTES
Attestation/Supervisory note for [facility]      The patient is a 64-year-old female presenting to the emergency department for evaluation of abdominal pain, nausea and vomiting and constipation.  The patient states that she was recently seen in the emergency room and diagnosed with diverticulitis.  She has been taking Augmentin for treatment of her diverticulitis.  She states that she has had worsening symptoms since leaving the emergency department.  She has not followed up with her primary care physician yet.  She denies any headache or visual changes.  No chest pain or shortness of breath.  No neck or back pain.  No focal weakness or numbness.  No fever or chills.  No vaginal discharge.  No urinary complaints.  All pertinent positives and negatives are recorded above.  All other systems reviewed and otherwise negative.  Vital signs with diastolic hypertension but otherwise within normal limits.  Physical exam with a well-nourished well-developed female in no acute distress.  HEENT exam within normal limits.  She has no evidence of airway compromise or respiratory distress.  Abdominal exam with mild diffuse tenderness to palpation with increased pain in the left lower quadrant.  No rebound or guarding.  No palpable masses.  No flank pain with percussion or palpation.  She does not have any gross motor, neurologic or vascular deficits on exam.  She is able to walk and stand without assistance.  She is able to converse without difficulty.  Pulses are equal bilaterally.      EKG with normal sinus rhythm at 91 bpm, low voltage, normal axis, normal ST segment, and a slight diffuse flattening of the T waves      IV fluids, IV Pepcid, IV Toradol and IV Zofran ordered      Diagnostic labs with with a mild leukocytosis and evidence of urinary tract infection but otherwise unremarkable.      CT abdomen pelvis w IV contrast   Final Result   1.Findings compatible with acute diverticulitis involving the   distal descending  colon similar compared to 5/10/2025. No extraluminal   gas or abscess.   2.Moderate wall thickening of the colon extending from the distal   descending colon to the rectum similar compared to prior. No discrete   underlying mass identified. Findings suggestive of accompanying   segmental colitis.  Recommend follow-up colonoscopy once symptoms   resolve to exclude underlying neoplastic process.   3.Moderate formed stool in the more proximal right hemicolon and   transverse colon.   4.Mild hepatic steatosis.   Signed by Jesus Manuel Perez MD           The patient does not have any evidence of hemodynamic instability.  She is well-perfused on exam and has no evidence of sepsis on reperfusion exam.  She does have evidence of a mild leukocytosis and urinary tract infection on diagnostic labs.  CT abdomen pelvis shows findings compatible with acute diverticulitis involving the distal descending colon but is similar to previous imaging.  There is no evidence of perforation or abscess.  She does not have any evidence of acute appendicitis, pancreatitis, cholecystitis, or bowel obstruction.  Previous urine culture does show susceptibility to Augmentin.      The patient was released in good condition with a prescription for Zofran and ibuprofen.  She will continue on the Augmentin as previously prescribed.  She was also given a prescription for MiraLAX.  She will follow-up with her primary care physician within 1 to 2 days for further management of her current symptoms and review of the urine culture results.  She will also follow-up with gastroenterology within 2 to 3 days for further management of her symptoms.  She will return to the emergency department sooner with worsening of symptoms or onset of new symptoms.        Impression/diagnosis:  Abdominal pain, left lower quadrant  Nausea and vomiting  Diastolic hypertension  Acute lower urinary tract infection      I personally saw the patient and made/approve the management  plan and take responsibility for the patient management.      I independently interpreted the following study (S) EKG and diagnostic labs      I personally discussed the patient's management with the patient      I reviewed the results of the diagnostic labs and diagnostic imaging.  Formal radiology read was completed by the radiologist.      Mireya Tello MD

## 2025-05-17 LAB
ATRIAL RATE: 91 BPM
P AXIS: 84 DEGREES
P OFFSET: 205 MS
P ONSET: 144 MS
PR INTERVAL: 154 MS
Q ONSET: 221 MS
QRS COUNT: 15 BEATS
QRS DURATION: 78 MS
QT INTERVAL: 350 MS
QTC CALCULATION(BAZETT): 430 MS
QTC FREDERICIA: 402 MS
R AXIS: 55 DEGREES
T AXIS: 64 DEGREES
T OFFSET: 396 MS
VENTRICULAR RATE: 91 BPM

## 2025-05-18 LAB — BACTERIA UR CULT: ABNORMAL

## 2025-06-12 ENCOUNTER — APPOINTMENT (OUTPATIENT)
Dept: GASTROENTEROLOGY | Facility: EXTERNAL LOCATION | Age: 65
End: 2025-06-12
Payer: MEDICARE

## 2025-06-12 DIAGNOSIS — D12.4 BENIGN NEOPLASM OF DESCENDING COLON: ICD-10-CM

## 2025-06-12 DIAGNOSIS — K57.32 DIVERTICULITIS OF LARGE INTESTINE WITHOUT PERFORATION OR ABSCESS WITHOUT BLEEDING: ICD-10-CM

## 2025-06-12 DIAGNOSIS — D12.0 BENIGN NEOPLASM OF CECUM: ICD-10-CM

## 2025-06-12 DIAGNOSIS — K57.92 ACUTE DIVERTICULITIS: ICD-10-CM

## 2025-06-12 DIAGNOSIS — Z12.11 COLON CANCER SCREENING: Primary | ICD-10-CM

## 2025-06-12 PROCEDURE — 0753T DGTZ GLS MCRSCP SLD LEVEL IV: CPT

## 2025-06-12 PROCEDURE — 88305 TISSUE EXAM BY PATHOLOGIST: CPT

## 2025-06-12 PROCEDURE — 88305 TISSUE EXAM BY PATHOLOGIST: CPT | Performed by: PATHOLOGY

## 2025-06-12 PROCEDURE — 45385 COLONOSCOPY W/LESION REMOVAL: CPT | Performed by: INTERNAL MEDICINE

## 2025-06-13 ENCOUNTER — APPOINTMENT (OUTPATIENT)
Dept: PRIMARY CARE | Facility: CLINIC | Age: 65
End: 2025-06-13
Payer: MEDICARE

## 2025-06-13 VITALS
OXYGEN SATURATION: 97 % | DIASTOLIC BLOOD PRESSURE: 79 MMHG | HEIGHT: 64 IN | BODY MASS INDEX: 25.1 KG/M2 | WEIGHT: 147 LBS | SYSTOLIC BLOOD PRESSURE: 128 MMHG | HEART RATE: 93 BPM

## 2025-06-13 DIAGNOSIS — Z09 HOSPITAL DISCHARGE FOLLOW-UP: ICD-10-CM

## 2025-06-13 DIAGNOSIS — F51.01 PRIMARY INSOMNIA: ICD-10-CM

## 2025-06-13 DIAGNOSIS — F32.0 CURRENT MILD EPISODE OF MAJOR DEPRESSIVE DISORDER WITHOUT PRIOR EPISODE: ICD-10-CM

## 2025-06-13 DIAGNOSIS — R94.31 ABNORMAL FINDING ON EKG: Primary | ICD-10-CM

## 2025-06-13 DIAGNOSIS — I10 DIASTOLIC HYPERTENSION: ICD-10-CM

## 2025-06-13 PROBLEM — Z00.00 HEALTH CARE MAINTENANCE: Status: RESOLVED | Noted: 2023-04-20 | Resolved: 2025-06-13

## 2025-06-13 PROCEDURE — 1036F TOBACCO NON-USER: CPT

## 2025-06-13 PROCEDURE — 3074F SYST BP LT 130 MM HG: CPT

## 2025-06-13 PROCEDURE — 3008F BODY MASS INDEX DOCD: CPT

## 2025-06-13 PROCEDURE — 3078F DIAST BP <80 MM HG: CPT

## 2025-06-13 PROCEDURE — 99214 OFFICE O/P EST MOD 30 MIN: CPT

## 2025-06-13 PROCEDURE — 93000 ELECTROCARDIOGRAM COMPLETE: CPT

## 2025-06-13 RX ORDER — VENLAFAXINE HYDROCHLORIDE 150 MG/1
150 CAPSULE, EXTENDED RELEASE ORAL DAILY
Qty: 90 CAPSULE | Refills: 1 | Status: SHIPPED | OUTPATIENT
Start: 2025-06-13

## 2025-06-13 ASSESSMENT — ENCOUNTER SYMPTOMS
NAUSEA: 1
CHANGE IN BOWEL HABIT: 0
FATIGUE: 1
HEADACHES: 0
INSOMNIA: 1
ABDOMINAL PAIN: 0
VISUAL CHANGE: 0
DEPRESSION: 1
DEPRESSED MOOD: 0
MEMORY IMPAIRMENT: 0
DECREASED CONCENTRATION: 1

## 2025-06-13 NOTE — PROGRESS NOTES
"Subjective   Patient ID: Vandana Coppola is a 64 y.o. female who presents for Follow-up (6 month follow up on medication) and Hospital Follow-up (ED follow up on Diverticulitis).    Pt recently in ER for abdominal pain, found to have diverticulitis, has since had colonoscopy with polyps removed and a 3 years follow-up ordered, while in ER EKG was done which had abnormal reading    Insomnia  This is a chronic problem. The current episode started more than 1 year ago. The problem occurs daily. The problem has been unchanged (well controlled with current medication). Associated symptoms include fatigue and nausea. Pertinent negatives include no abdominal pain, change in bowel habit, chest pain, headaches or visual change. Nothing aggravates the symptoms. Treatments tried: trazadone. The treatment provided significant relief.   Depression  Visit Type: follow-up  Patient presents with the following symptoms: decreased concentration and insomnia.  Patient is not experiencing: depressed mood, excessive worry and memory impairment.   Current severity: feels well controlled with current medication.          Review of Systems   Constitutional:  Positive for fatigue.   Cardiovascular:  Negative for chest pain.   Gastrointestinal:  Positive for nausea. Negative for abdominal pain and change in bowel habit.   Neurological:  Negative for headaches.   Psychiatric/Behavioral:  Positive for decreased concentration and depression. The patient has insomnia.        Objective   Ht 1.626 m (5' 4\")   Wt 66.7 kg (147 lb)   BMI 25.23 kg/m²     Physical Exam  Cardiovascular:      Rate and Rhythm: Normal rate and regular rhythm.      Chest Wall: PMI is not displaced. No thrill.      Pulses:           Carotid pulses are 2+ on the right side and 2+ on the left side.       Radial pulses are 2+ on the right side and 2+ on the left side.        Dorsalis pedis pulses are 2+ on the right side and 2+ on the left side.      Heart sounds: No " murmur heard.     No systolic murmur is present.      No diastolic murmur is present.       Assessment/Plan     Vandana was seen today for follow-up and hospital follow-up.  Diagnoses and all orders for this visit:  Abnormal finding on EKG  Comments:  ekg in hospital was abnormal will repeat today  Orders:  -     ECG 12 lead (Clinic Performed)  Hospital discharge follow-up  Comments:  has already had follow-up with gastro, feeling much improved, discussed ways to avoid constipation and importance of that  Orders:  -     ECG 12 lead (Clinic Performed)  Current mild episode of major depressive disorder without prior episode  -     venlafaxine XR (Effexor-XR) 150 mg 24 hr capsule; Take 1 capsule (150 mg) by mouth once daily. Do not crush or chew.  -     ECG 12 lead (Clinic Performed)  Primary insomnia  Comments:  well controlled with current medication  Diastolic hypertension  Comments:  bp good today  Other orders  -     Follow Up In Primary Care - Health Maintenance; Future  -     Follow Up In Primary Care - Health Maintenance; Future

## 2025-06-14 ENCOUNTER — LAB REQUISITION (OUTPATIENT)
Dept: LAB | Facility: HOSPITAL | Age: 65
End: 2025-06-14
Payer: MEDICARE

## 2025-06-24 LAB
LABORATORY COMMENT REPORT: NORMAL
PATH REPORT.FINAL DX SPEC: NORMAL
PATH REPORT.GROSS SPEC: NORMAL
PATH REPORT.RELEVANT HX SPEC: NORMAL
PATH REPORT.TOTAL CANCER: NORMAL

## 2025-12-19 ENCOUNTER — APPOINTMENT (OUTPATIENT)
Dept: PRIMARY CARE | Facility: CLINIC | Age: 65
End: 2025-12-19
Payer: MEDICARE